# Patient Record
Sex: FEMALE | Race: WHITE | NOT HISPANIC OR LATINO | URBAN - METROPOLITAN AREA
[De-identification: names, ages, dates, MRNs, and addresses within clinical notes are randomized per-mention and may not be internally consistent; named-entity substitution may affect disease eponyms.]

---

## 2023-10-16 ENCOUNTER — INPATIENT (INPATIENT)
Facility: HOSPITAL | Age: 78
LOS: 15 days | Discharge: HOME | DRG: 885 | End: 2023-11-01
Attending: PSYCHIATRY & NEUROLOGY | Admitting: PSYCHIATRY & NEUROLOGY
Payer: MEDICARE

## 2023-10-16 VITALS
RESPIRATION RATE: 18 BRPM | DIASTOLIC BLOOD PRESSURE: 104 MMHG | HEART RATE: 128 BPM | OXYGEN SATURATION: 96 % | SYSTOLIC BLOOD PRESSURE: 179 MMHG | WEIGHT: 100.09 LBS | HEIGHT: 62 IN | TEMPERATURE: 98 F

## 2023-10-16 DIAGNOSIS — F33.2 MAJOR DEPRESSIVE DISORDER, RECURRENT SEVERE WITHOUT PSYCHOTIC FEATURES: ICD-10-CM

## 2023-10-16 LAB
ALBUMIN SERPL ELPH-MCNC: 4.3 G/DL — SIGNIFICANT CHANGE UP (ref 3.3–5)
ALP SERPL-CCNC: 64 U/L — SIGNIFICANT CHANGE UP (ref 40–120)
ALT FLD-CCNC: 10 U/L — SIGNIFICANT CHANGE UP (ref 10–45)
ANION GAP SERPL CALC-SCNC: 13 MMOL/L — SIGNIFICANT CHANGE UP (ref 5–17)
APAP SERPL-MCNC: <5 UG/ML — LOW (ref 10–30)
APTT BLD: 33.8 SEC — SIGNIFICANT CHANGE UP (ref 24.5–35.6)
AST SERPL-CCNC: 20 U/L — SIGNIFICANT CHANGE UP (ref 10–40)
BASE EXCESS BLDV CALC-SCNC: 8.1 MMOL/L — HIGH (ref -2–3)
BASOPHILS # BLD AUTO: 0.06 K/UL — SIGNIFICANT CHANGE UP (ref 0–0.2)
BASOPHILS NFR BLD AUTO: 0.5 % — SIGNIFICANT CHANGE UP (ref 0–2)
BILIRUB SERPL-MCNC: 0.6 MG/DL — SIGNIFICANT CHANGE UP (ref 0.2–1.2)
BUN SERPL-MCNC: 14 MG/DL — SIGNIFICANT CHANGE UP (ref 7–23)
CALCIUM SERPL-MCNC: 9.7 MG/DL — SIGNIFICANT CHANGE UP (ref 8.4–10.5)
CHLORIDE SERPL-SCNC: 100 MMOL/L — SIGNIFICANT CHANGE UP (ref 96–108)
CO2 BLDV-SCNC: 34.9 MMOL/L — HIGH (ref 22–26)
CO2 SERPL-SCNC: 27 MMOL/L — SIGNIFICANT CHANGE UP (ref 22–31)
CREAT SERPL-MCNC: 1.03 MG/DL — SIGNIFICANT CHANGE UP (ref 0.5–1.3)
EGFR: 56 ML/MIN/1.73M2 — LOW
EOSINOPHIL # BLD AUTO: 0.05 K/UL — SIGNIFICANT CHANGE UP (ref 0–0.5)
EOSINOPHIL NFR BLD AUTO: 0.5 % — SIGNIFICANT CHANGE UP (ref 0–6)
ETHANOL SERPL-MCNC: <10 MG/DL — SIGNIFICANT CHANGE UP (ref 0–10)
GLUCOSE SERPL-MCNC: 135 MG/DL — HIGH (ref 70–99)
HCO3 BLDV-SCNC: 33 MMOL/L — HIGH (ref 22–29)
HCT VFR BLD CALC: 38.5 % — SIGNIFICANT CHANGE UP (ref 34.5–45)
HGB BLD-MCNC: 12.7 G/DL — SIGNIFICANT CHANGE UP (ref 11.5–15.5)
IMM GRANULOCYTES NFR BLD AUTO: 0.4 % — SIGNIFICANT CHANGE UP (ref 0–0.9)
INR BLD: 1.05 — SIGNIFICANT CHANGE UP (ref 0.85–1.18)
LYMPHOCYTES # BLD AUTO: 1.21 K/UL — SIGNIFICANT CHANGE UP (ref 1–3.3)
LYMPHOCYTES # BLD AUTO: 11 % — LOW (ref 13–44)
MCHC RBC-ENTMCNC: 27.5 PG — SIGNIFICANT CHANGE UP (ref 27–34)
MCHC RBC-ENTMCNC: 33 GM/DL — SIGNIFICANT CHANGE UP (ref 32–36)
MCV RBC AUTO: 83.5 FL — SIGNIFICANT CHANGE UP (ref 80–100)
MONOCYTES # BLD AUTO: 0.57 K/UL — SIGNIFICANT CHANGE UP (ref 0–0.9)
MONOCYTES NFR BLD AUTO: 5.2 % — SIGNIFICANT CHANGE UP (ref 2–14)
NEUTROPHILS # BLD AUTO: 9.09 K/UL — HIGH (ref 1.8–7.4)
NEUTROPHILS NFR BLD AUTO: 82.4 % — HIGH (ref 43–77)
NRBC # BLD: 0 /100 WBCS — SIGNIFICANT CHANGE UP (ref 0–0)
PCO2 BLDV: 48 MMHG — HIGH (ref 39–42)
PH BLDV: 7.45 — HIGH (ref 7.32–7.43)
PLATELET # BLD AUTO: 221 K/UL — SIGNIFICANT CHANGE UP (ref 150–400)
PO2 BLDV: 55 MMHG — HIGH (ref 25–45)
POTASSIUM SERPL-MCNC: 3.3 MMOL/L — LOW (ref 3.5–5.3)
POTASSIUM SERPL-SCNC: 3.3 MMOL/L — LOW (ref 3.5–5.3)
PROT SERPL-MCNC: 7.4 G/DL — SIGNIFICANT CHANGE UP (ref 6–8.3)
PROTHROM AB SERPL-ACNC: 12 SEC — SIGNIFICANT CHANGE UP (ref 9.5–13)
RBC # BLD: 4.61 M/UL — SIGNIFICANT CHANGE UP (ref 3.8–5.2)
RBC # FLD: 14.5 % — SIGNIFICANT CHANGE UP (ref 10.3–14.5)
SALICYLATES SERPL-MCNC: <0.3 MG/DL — LOW (ref 2.8–20)
SAO2 % BLDV: 89.4 % — HIGH (ref 67–88)
SARS-COV-2 RNA SPEC QL NAA+PROBE: NEGATIVE — SIGNIFICANT CHANGE UP
SARS-COV-2 RNA SPEC QL NAA+PROBE: NEGATIVE — SIGNIFICANT CHANGE UP
SODIUM SERPL-SCNC: 140 MMOL/L — SIGNIFICANT CHANGE UP (ref 135–145)
WBC # BLD: 11.02 K/UL — HIGH (ref 3.8–10.5)
WBC # FLD AUTO: 11.02 K/UL — HIGH (ref 3.8–10.5)

## 2023-10-16 PROCEDURE — 99285 EMERGENCY DEPT VISIT HI MDM: CPT

## 2023-10-16 PROCEDURE — 90792 PSYCH DIAG EVAL W/MED SRVCS: CPT

## 2023-10-16 RX ORDER — SODIUM CHLORIDE 9 MG/ML
1000 INJECTION INTRAMUSCULAR; INTRAVENOUS; SUBCUTANEOUS ONCE
Refills: 0 | Status: COMPLETED | OUTPATIENT
Start: 2023-10-16 | End: 2023-10-16

## 2023-10-16 RX ORDER — CLONAZEPAM 1 MG
0.5 TABLET ORAL ONCE
Refills: 0 | Status: DISCONTINUED | OUTPATIENT
Start: 2023-10-16 | End: 2023-10-16

## 2023-10-16 RX ORDER — AMLODIPINE BESYLATE 2.5 MG/1
5 TABLET ORAL ONCE
Refills: 0 | Status: COMPLETED | OUTPATIENT
Start: 2023-10-16 | End: 2023-10-16

## 2023-10-16 RX ORDER — NICOTINE POLACRILEX 2 MG
2 GUM BUCCAL
Refills: 0 | Status: DISCONTINUED | OUTPATIENT
Start: 2023-10-16 | End: 2023-11-01

## 2023-10-16 RX ORDER — TRAZODONE HCL 50 MG
50 TABLET ORAL AT BEDTIME
Refills: 0 | Status: DISCONTINUED | OUTPATIENT
Start: 2023-10-16 | End: 2023-10-30

## 2023-10-16 RX ORDER — POTASSIUM CHLORIDE 20 MEQ
40 PACKET (EA) ORAL ONCE
Refills: 0 | Status: COMPLETED | OUTPATIENT
Start: 2023-10-16 | End: 2023-10-16

## 2023-10-16 RX ORDER — ALPRAZOLAM 0.25 MG
0.25 TABLET ORAL ONCE
Refills: 0 | Status: DISCONTINUED | OUTPATIENT
Start: 2023-10-16 | End: 2023-10-16

## 2023-10-16 RX ADMIN — Medication 0.5 MILLIGRAM(S): at 17:26

## 2023-10-16 RX ADMIN — Medication 40 MILLIEQUIVALENT(S): at 16:54

## 2023-10-16 RX ADMIN — Medication 0.25 MILLIGRAM(S): at 19:35

## 2023-10-16 RX ADMIN — Medication 50 MILLIGRAM(S): at 23:05

## 2023-10-16 RX ADMIN — AMLODIPINE BESYLATE 5 MILLIGRAM(S): 2.5 TABLET ORAL at 18:12

## 2023-10-16 RX ADMIN — SODIUM CHLORIDE 1000 MILLILITER(S): 9 INJECTION INTRAMUSCULAR; INTRAVENOUS; SUBCUTANEOUS at 14:57

## 2023-10-16 NOTE — ED ADULT NURSE NOTE - NSFALLUNIVINTERV_ED_ALL_ED
Bed/Stretcher in lowest position, wheels locked, appropriate side rails in place/Call bell, personal items and telephone in reach/Instruct patient to call for assistance before getting out of bed/chair/stretcher/Non-slip footwear applied when patient is off stretcher/[place] to call system/Physically safe environment - no spills, clutter or unnecessary equipment/Purposeful proactive rounding/Room/bathroom lighting operational, light cord in reach

## 2023-10-16 NOTE — ED PROVIDER NOTE - EYES, MLM
See patient mychart message and provider response.    Aysha Lock   [facility]  RN     Clear bilaterally, pupils equal, round and reactive to light.

## 2023-10-16 NOTE — ED BEHAVIORAL HEALTH ASSESSMENT NOTE - HPI (INCLUDE ILLNESS QUALITY, SEVERITY, DURATION, TIMING, CONTEXT, MODIFYING FACTORS, ASSOCIATED SIGNS AND SYMPTOMS)
77 yo F, no PMHx, hx depression treated with remeron, trazodone, and klonopin, in treatment with Dr Stevens outpt, no hx SA, arrived with friend s/p suicide attempt last night by overdose.  d/w ER Attg, pt medically cleared, reporting that she wishes she had succeeded last night.  Pt assessed with PA destiney An.  Pt reports being depressed for a long time, lost 20lbs since April, has had financial difficulty and is selling co-op on [address]  Pt with no children or living parents, describes isolation, has been contemplating suicide for months, then finally last night took handfully of klonopin 0.5mg, trazodone 100mg, remeron 15mg, approx #40 tabs in all, with intent to end her life.  Pt told no one of her suicide plans.  Pt started to vomit, then took additional doses of each medication to go to sleep, again with hope of not waking up.  This morning, pt felt shaky, texted psychiatrist and friend Amrita.  Psychiatrist told her to come to the ER, pt did not want to but came with friend.  Pt still wishes she had succeeded, "I've had a good life".  Pt reports being on klonopin 0.5mg PO qHS and trazodone 100mg PO qHS for many years, and over the spring remeron was added daniella to stimulate appetite.  Pt still feels suicidal, feels less likely to reattempt in the hospital but hesitant to answer.    PPHx: no prior SA, admissions, NSSI.  Has been with psychiatrist since .    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  Pt lives alone.  No children.      Substance: pt smokes 1ppd, 1 drink alcohol a few times per year, no other substances.  No hx addiction.    Fhx: mother and father had depression, mother's aunt  by suicide.    Collateral friend Kaylin Fausto [phone], present in ER: has known pt over 40yrs, no hx SA or admission, pt has made offhand comment about suicide that seemed to be a non-literal expression, no serious talk of ending her life.  Pt has been isolative, cancelling on plans, lonely, daniella sad about selling co-op and going through this process alone, financial stressors leading to this, is very depressed about this.    Psychiatrist Dr Stevens [phone]- student left message

## 2023-10-16 NOTE — BH CHART NOTE - NSEVENTNOTEFT_PSY_ALL_CORE
79 yo female with no PMH and PPH of depression treated with remeron, trazodone, and klonopin in outpatient treatment with Dr. Stevens, no prior hx of suicide attempt arrived to the ED BIB friend s/p SA the night prior. This is in the context of losing 20 lbs, financial diffiuclties, selling her co-op on [address]. She has no children or living family and reports feeling isolated with only 3 close friends. She reports months of contemplating suicide and decided to take a handful of klonopin 0.5 mg, trazodone 100mg, and remeron 15 mg approximately 40 tabs. This was in the intent to end her life. On arrival to the unit when asked about continuing SI she reports that "I wish I wasn't here anymore." She follows with Dr. Stevens [phone]. She reports no history of falls and is not attempting to elope from the unit. Notably her BP was elevated in the Ed in the context of no history of HTN, BP on arrival as 156/93. She reports feeling anxious on the unit. The ED team gave the patient amlodipine before admission. She reports smoking 1 ppd cigarettes outside of the hospital with a few drinks a year of alcohol with no other substance use.      in ED     Plan:  legal status 9.13  - 1:1 observation for active suicidal ideation in the context of recent SA.  - ambulate as tolerated.   - ativan 1 mg agitation/benzo withdrawal symptoms  - hold amlodipine 5 mg based on vitals signs in the AM  - nicorette gum 2 mg q2 hours prn   - trazodone 50 mg nightly PO prn for insomnia   - will hold home meds due to SA

## 2023-10-16 NOTE — ED ADULT NURSE NOTE - NS ED BHA MSE SPEECH VOLUME
Returned patient call concerning pre-op workup.  Detailed the reasoning behind the workup.  Patient voiced understanding.    Will proceed.  
Normal

## 2023-10-16 NOTE — ED ADULT NURSE REASSESSMENT NOTE - NS ED NURSE REASSESS COMMENT FT1
PT currently on a 1 to 1, , endorsed by day shift. Pt is alert and oriented x4, VSS, currently awaiting inpatient bed after covid test. Denies chest pain, sob, nausea, vomiting, fever

## 2023-10-16 NOTE — ED BEHAVIORAL HEALTH ASSESSMENT NOTE - ABUSE / TRAUMA HISTORY
Karin Cardoza attended 2 hours of group therapy today.    7/25/2017 2:41 PM Sunita Rincon  
Deferred

## 2023-10-16 NOTE — ED PROVIDER NOTE - OBJECTIVE STATEMENT
77 y/o f with PMH of depression presents to ED with suicidal attempt by overdose.  As per patient she took a mix of remeron, trazadone 100mg and clonazepam 0.5mg for a total of 40 pills at 11pm night prior.  Pt states she immediately proceeded to vomit multiple times and then took her normal dose of medication and went to sleep.  Pt woke at 10am this morning and told her friend what she did and they called psychiatrist who urged pt to present to ED.  Pt has contemplated suicide in the past but has never attempted.  Denies drug use.  + smoking, social alcohol use.  Pt still feels like she wishes she wasn't here.  Friend at bedside as well.

## 2023-10-16 NOTE — ED BEHAVIORAL HEALTH ASSESSMENT NOTE - NS_RISKASSESSMENTINTER_PSY_ALL_CORE
Clean wound with wound cleanser or normal saline  Apply collagen dressing to wound bed  Cut a small piece the size of the wound  Cover with a border gauze  Change daily    Return to clinic in two weeks  Obtain [facility]oform from [facility]    Recognizing and Treating Wound Infection  Wounds can become infected with harmful bacteria. This prevents healing and increases the risk of scars. In some cases, the infection may spread to other parts of the body. And infection with the bacteria that cause tetanus can be fatal. Know what to watch for and get prompt treatment for infection.    Risk Factors  A wound is more likely to become infected if it:  · Results from a puncture, such as from a nail or piece of glass.  · Results from a human or animal bite.  · Isn't cleaned or treated within 8 hours.  · Occurs in your hand, foot, leg, armpit, or groin.  · Contains dirt or saliva.  · Heals very slowly.  · Occurs in a person with diabetes, alcoholism, or a compromised immune system.    Symptoms of Infection  Call your healthcare provider at the first sign of infection, such as:  · Yellow, yellow-green, or foul-smelling drainage from a wound  · Increased pain, swelling, or redness in or near a wound  · A change in the color or size of a wound  · Red streaks in the skin around the wound  · Fever  Preventing Wound Infection   Follow these steps to help keep wounds from developing infection:  · Wash the wound right away with soap and water.  · Apply a small amount of antibiotic ointment. You can buy this at the drugstore without a prescription.  · Cover wounds with a bandage or gauze dressing.  · Keep the wound clean and dry for the first 24 hours.  · Change the dressing daily using sterile gloves.    Treatment  Treatment is likely to depend on the type and extent of your infection. Your healthcare provider may prescribe oral antibiotics to help fight bacteria. He or she may also flush the wound with an antibiotic solution  or apply an antibiotic ointment. Sometimes an abscess (a pocket of pus) may form. In that case, the abscess will be opened and the fluid drained. You may need hospital care if the infection is very severe.  © 0828-7745 Giana Morelos, [address] [place] [address], [place] [address]. All rights reserved. This information is not intended as a substitute for professional medical care. Always follow your healthcare professional's instructions.     High Acute Suicide Risk

## 2023-10-16 NOTE — ED PROVIDER NOTE - CLINICAL SUMMARY MEDICAL DECISION MAKING FREE TEXT BOX
78 F w suicide attempt- incidental HTN noted- no ha/cp/sob- ok to admit to psych- cont norvasc 5 mg 78 F w suicide attempt- incidental HTN noted- no ha/cp/sob- ok to admit to psych- cont norvasc 5 mg    Pt with overdose 15 hours prior to presentation with multiple episodes of vomiting - immediately placed on 1:1, ekg sinus tach otherwise ok, bp noted to be high (benzo withdrawal vs htn) meds given, K replaced, placed on 1:1 and seen by psych who agrees on admission.  Signed out to Dr. Smith pending bp control.

## 2023-10-16 NOTE — BH PATIENT PROFILE - FALL HARM RISK - UNIVERSAL INTERVENTIONS
Bed in lowest position, wheels locked, appropriate side rails in place/Call bell, personal items and telephone in reach/Instruct patient to call for assistance before getting out of bed or chair/Non-slip footwear when patient is out of bed/[place] to call system/Physically safe environment - no spills, clutter or unnecessary equipment/Purposeful Proactive Rounding/Room/bathroom lighting operational, light cord in reach

## 2023-10-16 NOTE — ED ADULT TRIAGE NOTE - CHIEF COMPLAINT QUOTE
pt presents to ER, states she took 30 or 40 pills last night of klonopin, trazadone, and remeron in attempts to kill herself. pt states she vomited shortly after taking pills. pt c/o feeling "foggy" and has some abdominal discomfort. pt placed on 1:1 watch. belongings secured, security called for wanding.

## 2023-10-16 NOTE — ED ADULT NURSE NOTE - OBJECTIVE STATEMENT
Patient to the ED s/p SI attempt last night. Patient reports she has been experiencing financial trouble and attempted to end her life by overdosing on unknown amount of klonopin, trazadone and remeron. C/O fatigue, AAOX4, speaking in complete sentences, NAD.

## 2023-10-16 NOTE — ED ADULT NURSE REASSESSMENT NOTE - NS ED NURSE REASSESS COMMENT FT1
1:1 direct observation initiated upon ED arrival. Patient changed into gown, belongings bagged and wanded by security. Friend at bedside. AAOx4, NAD.

## 2023-10-17 PROCEDURE — 99223 1ST HOSP IP/OBS HIGH 75: CPT

## 2023-10-17 RX ORDER — CLONAZEPAM 1 MG
1 TABLET ORAL AT BEDTIME
Refills: 0 | Status: DISCONTINUED | OUTPATIENT
Start: 2023-10-17 | End: 2023-10-18

## 2023-10-17 RX ORDER — INFLUENZA VIRUS VACCINE 15; 15; 15; 15 UG/.5ML; UG/.5ML; UG/.5ML; UG/.5ML
0.7 SUSPENSION INTRAMUSCULAR ONCE
Refills: 0 | Status: COMPLETED | OUTPATIENT
Start: 2023-10-17 | End: 2023-10-25

## 2023-10-17 RX ORDER — AMLODIPINE BESYLATE 2.5 MG/1
5 TABLET ORAL ONCE
Refills: 0 | Status: COMPLETED | OUTPATIENT
Start: 2023-10-17 | End: 2023-10-17

## 2023-10-17 RX ADMIN — Medication 50 MILLIGRAM(S): at 21:16

## 2023-10-17 RX ADMIN — Medication 1 MILLIGRAM(S): at 21:16

## 2023-10-17 RX ADMIN — AMLODIPINE BESYLATE 5 MILLIGRAM(S): 2.5 TABLET ORAL at 21:16

## 2023-10-17 RX ADMIN — Medication 2 MILLIGRAM(S): at 16:35

## 2023-10-17 RX ADMIN — Medication 1 MILLIGRAM(S): at 02:14

## 2023-10-17 NOTE — BH INPATIENT PSYCHIATRY ASSESSMENT NOTE - NSBHASSESSSUMMFT_PSY_ALL_CORE
This is a 77y/o  female, , retired, domiciled in apartment alone. No pertinent medical hx. Psychiatric hx of reported depression, anxiety, insomnia, currently in treatment with Dr. Stevens (remeron, klonopin, trazodone). No prior psychiatric hospitalizations, no hx of SA or NSSIB. No hx of drug/etoh abuse. Hx of trauma in childhood. No current legal issues. Patient presents to ED at recommendation of her psychiatrist after disclosing to him that she had overdosed on her medications the night before- a combination of remeron, klonopin and trazodone. Utox negative for all substances. Patient admitted to [facility] 2[facility].    -Medicine consult to be placed for recs re elevated bp  -Klonopin 0.5mg qhs  -Trazodone 50mg qhs prn insomnia  -Collateral to be attained from pt's psychiatrist

## 2023-10-17 NOTE — BH INPATIENT PSYCHIATRY ASSESSMENT NOTE - NSBHATTESTAPPBILLTIME_PSY_A_CORE
I attest my time as JOSE is greater than 50% of the total combined time spent on qualifying patient care activities. I have reviewed and verified the documentation.

## 2023-10-17 NOTE — BH INPATIENT PSYCHIATRY ASSESSMENT NOTE - NSBHMETABOLIC_PSY_ALL_CORE_FT
BMI: BMI (kg/m2): 18.3 (10-16-23 @ 14:29)  HbA1c:   Glucose:   BP: 165/92 (10-17-23 @ 09:30) (162/96 - 199/122)  Lipid Panel:  BMI: BMI (kg/m2): 18.3 (10-16-23 @ 14:29)  HbA1c: A1C with Estimated Average Glucose Result: 5.2 % (10-18-23 @ 14:50)    Glucose:   BP: 159/85 (10-19-23 @ 06:12) (146/97 - 199/122)  Lipid Panel: Date/Time: 10-18-23 @ 14:50  Cholesterol, Serum: 249  Direct LDL: --  HDL Cholesterol, Serum: 54  Total Cholesterol/HDL Ration Measurement: --  Triglycerides, Serum: 115   BMI: BMI (kg/m2): 18.3 (10-16-23 @ 14:29)  HbA1c: A1C with Estimated Average Glucose Result: 5.2 % (10-18-23 @ 14:50)    Glucose:   BP: 157/92 (10-20-23 @ 11:50) (136/77 - 176/109)  Lipid Panel: Date/Time: 10-18-23 @ 14:50  Cholesterol, Serum: 249  Direct LDL: --  HDL Cholesterol, Serum: 54  Total Cholesterol/HDL Ration Measurement: --  Triglycerides, Serum: 115

## 2023-10-17 NOTE — BH SOCIAL WORK INITIAL PSYCHOSOCIAL EVALUATION - OTHER PAST PSYCHIATRIC HISTORY (INCLUDE DETAILS REGARDING ONSET, COURSE OF ILLNESS, INPATIENT/OUTPATIENT TREATMENT)
above PPHx Major Depressive Disorder, Recurrent, Moderate  No PMHx  No prior psychiatric hospitalizations  Denies hx NSSIB  Endorses hx SA (x1 that led to current hospitalization, via overdose of prescription psychiatric meds; otherwise no other SA)  Denies current HI, PI, AVH  Endorses current SI (contemplating suicide for the past few months, with plan and intent to overdose on prescription meds, with attempt)    Pt is a 79yo White female,  20 years,  x2, retired business owner, non-caregiver, no children, domiciled in private apartment in [place] by herself. Pt initially presented to [facility] ED, BIB friend Kaylin Lambert ([phone]) a/b psychiatrist Dr. Stevens ([phone]) s/p intentional overdose via prescription psychiatric medications c/o worsening depressive symptoms in the context of psychosocial stressors in continued social isolation. Pt reports having a history of Major Depression "for a long time", most recently with worsening symptoms including significant weight loss (20lbs since April of this year) and worsening mood. On the evening prior to admission, patient attempted suicide by taking a handful of Klonopin, Trazadone, and Remeron (appx. 40 tablets total) with the intent to die without telling anyone. Pt began to vomit after ingesting the tablets, after which she took additional doses of each medication to go to sleep and not wake up. Pt texted psychiatrist and friend who have both recommended pt go to the ED for evaluation. Pt would benefit from continued outpatient medication management, peer support, and individual psychotherapy.

## 2023-10-17 NOTE — BH INPATIENT PSYCHIATRY ASSESSMENT NOTE - NSBHCHARTREVIEWVS_PSY_A_CORE FT
Vital Signs Last 24 Hrs  T(C): 37.2 (10-17-23 @ 09:30), Max: 37.2 (10-17-23 @ 09:30)  T(F): 98.9 (10-17-23 @ 09:30), Max: 98.9 (10-17-23 @ 09:30)  HR: 97 (10-17-23 @ 09:30) (70 - 128)  BP: 165/92 (10-17-23 @ 09:30) (162/96 - 199/122)  BP(mean): --  RR: 18 (10-17-23 @ 09:30) (16 - 18)  SpO2: 97% (10-17-23 @ 09:30) (96% - 99%)     Vital Signs Last 24 Hrs  T(C): 36.4 (10-19-23 @ 06:12), Max: 37.1 (10-18-23 @ 16:34)  T(F): 97.5 (10-19-23 @ 06:12), Max: 98.8 (10-18-23 @ 16:34)  HR: 75 (10-19-23 @ 06:12) (75 - 87)  BP: 159/85 (10-19-23 @ 06:12) (155/92 - 166/119)  BP(mean): --  RR: 18 (10-18-23 @ 16:34) (18 - 18)  SpO2: 97% (10-19-23 @ 06:12) (97% - 97%)     Vital Signs Last 24 Hrs  T(C): 36.8 (10-20-23 @ 09:56), Max: 37.2 (10-19-23 @ 20:22)  T(F): 98.2 (10-20-23 @ 09:56), Max: 98.9 (10-19-23 @ 20:22)  HR: 74 (10-20-23 @ 11:50) (73 - 78)  BP: 157/92 (10-20-23 @ 11:50) (146/81 - 176/109)  BP(mean): --  RR: 18 (10-20-23 @ 06:12) (16 - 18)  SpO2: 97% (10-20-23 @ 09:56) (96% - 97%)

## 2023-10-17 NOTE — CHART NOTE - NSCHARTNOTEFT_GEN_A_CORE
Provider contacted by nursing for patient with HTN. Patient reportedly resting calmly in bed, in no acute distress, no chest pain, no SOB. BP taken 20 minutes ago was 165/121, repeat was 173/111. Provider ordered STAT dose of PO Amlodipine 5 mg with plan to reassess BP in 30 minutes.

## 2023-10-17 NOTE — BH INPATIENT PSYCHIATRY ASSESSMENT NOTE - HPI (INCLUDE ILLNESS QUALITY, SEVERITY, DURATION, TIMING, CONTEXT, MODIFYING FACTORS, ASSOCIATED SIGNS AND SYMPTOMS)
This is a 79y/o  female, , retired, domiciled in apartment alone. No pertinent medical hx. Psychiatric hx of reported depression, anxiety, insomnia, currently in treatment with Dr. Stevens (remeron, klonopin, trazodone). No prior psychiatric hospitalizations, no hx of SA or NSSIB. No hx of drug/etoh abuse. Hx of trauma in childhood. No current legal issues. Patient presents to ED at recommendation of her psychiatrist after disclosing to him that she had overdosed on her medications the night before- a combination of remeron, klonopin and trazodone. Utox negative for all substances. Patient admitted to [address] [place] [address].    Patient states that she has had a good life, is pushing 80 years old and running out of money. She realized that some time ago she was running out of money and would no longer be able to pay for her condo. She decided to sell it. However it required renovations which started in August. She has been feeling overwhelmed with the renovations underway, the various workmen in the space and dust in the home and also having to ask a friend for a $10k loan. Also she had been going through mementos of her life during this time and has been feeling depressed. She had been having thoughts of suicide for the last 2 months and has been 'getting up the courage' to do it. She did not shares this with anyone, and had never attempted suicide before in her life. She told the workmen not to come in for the next 5 days and states 'my intention was to take all these pills, go to sleep and not wake up.' She anticipated that either her super or  would find her. However she was unable to keep the pills down. The next day she woke up and texted her psychiatrist who told her to go to the hospital. She had a friend meet her outside the ED before coming inside. She states that once she realized that she would not be able to keep her standard of living once selling her condo as well as feeling 'very ashamed' about her circumstances in addition to being concerned about appearances she thought more of suicide.    She shares that she has suffered from depression since age 21, and has had therapy on and off since then. She had been treated with trazodone and klonopin for years until April when remeron was added to regimen as she was losing a lot of weight. She states that since deciding to sell her condo her anxiety has increased and subsequently her appetite decreased and in an effort to save money she hasn't been going out to eat much. Tobacco use has also increased from 1/2ppd to 1ppd. Sleep has been much better since addition of remeron. Denies any other drug/etoh use. 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    When asked about feelings regarding recent failed attempt she states 'I feel disappointed.' She also acknowledges that overdose is the ideal attempt for her and other ideas such as jumping out of a building or cutting her wrists would be too dramatic. 'I really wouldn't mind turning out the lights and going to sleep,'     She states that since admission she has spoken to several friends who will help her moving forward with getting a new apartment and financially. She is agreeable to initiation of an antidepressant as she acknowledges feeling depressed. No psychotic sxs or bipolar sxs elicited.       Per ED report:   77 yo F, no PMHx, hx depression treated with remeron, trazodone, and klonopin, in treatment with Dr Stevens outpt, no hx SA, arrived with friend s/p suicide attempt last night by overdose.  d/w ER Attg, pt medically cleared, reporting that she wishes she had succeeded last night.  Pt assessed with PA destiney An.  Pt reports being depressed for a long time, lost 20lbs since April, has had financial difficulty and is selling co-op on [address]  Pt with no children or living parents, describes isolation, has been contemplating suicide for months, then finally last night took handfully of klonopin 0.5mg, trazodone 100mg, remeron 15mg, approx #40 tabs in all, with intent to end her life.  Pt told no one of her suicide plans.  Pt started to vomit, then took additional doses of each medication to go to sleep, again with hope of not waking up.  This morning, pt felt shaky, texted psychiatrist and friend Amrita.  Psychiatrist told her to come to the ER, pt did not want to but came with friend.  Pt still wishes she had succeeded, "I've had a good life".  Pt reports being on klonopin 0.5mg PO qHS and trazodone 100mg PO qHS for many years, and over the spring remeron was added daniella to stimulate appetite.  Pt still feels suicidal, feels less likely to reattempt in the hospital but hesitant to answer.    PPHx: no prior SA, admissions, NSSI.  Has been with psychiatrist since .    Shx:  20yrs,  x2, used to run successful businsses, now retired.  Pt lives alone.  No children.      Substance: pt smokes 1ppd, 1 drink alcohol a few times per year, no other substances.  No hx addiction.    Fhx: mother and father had depression, mother's aunt  by suicide.    Collateral friend Kaylin Lambert [phone], present in ER: has known pt over 40yrs, no hx SA or admission, pt has made offhand comment about suicide that seemed to be a non-literal expression, no serious talk of ending her life.  Pt has been isolative, cancelling on plans, lonely, daniella sad about selling co-op and going through this process alone, financial stressors leading to this, is very depressed about this.    Psychiatrist Dr Stevens [phone]- student left message

## 2023-10-17 NOTE — BH INPATIENT PSYCHIATRY ASSESSMENT NOTE - NSBHCRANIAL_PSY_ALL_CORE
Likely viral  Home and self care measures reviewed    Call if worsening    Pt and parent agreeable to care plan   Recognizes 2 fingers or can read (II)/Smiles, shows teeth, opens mouth, sticks out tongue (V, VII, XI)/Normal speech (IX, X, XII)/Extraocular Eye Movement Intact  (III, IV, VI)/Shoulder shrug (XI)/Hearing intact (VIII)

## 2023-10-17 NOTE — BH INPATIENT PSYCHIATRY ASSESSMENT NOTE - CURRENT MEDICATION
MEDICATIONS  (STANDING):  influenza  Vaccine (HIGH DOSE) 0.7 milliLiter(s) IntraMuscular once    MEDICATIONS  (PRN):  LORazepam     Tablet 1 milliGRAM(s) Oral every 6 hours PRN agitation/benzo withdrawal  nicotine  Polacrilex Gum 2 milliGRAM(s) Oral every 2 hours PRN nicotine dependence without withdrawal  traZODone 50 milliGRAM(s) Oral at bedtime PRN insomnia   MEDICATIONS  (STANDING):  clonazePAM  Tablet 0.5 milliGRAM(s) Oral at bedtime  escitalopram 5 milliGRAM(s) Oral daily  influenza  Vaccine (HIGH DOSE) 0.7 milliLiter(s) IntraMuscular once    MEDICATIONS  (PRN):  LORazepam     Tablet 1 milliGRAM(s) Oral every 6 hours PRN agitation/benzo withdrawal  nicotine  Polacrilex Gum 2 milliGRAM(s) Oral every 2 hours PRN nicotine dependence without withdrawal  traZODone 50 milliGRAM(s) Oral at bedtime PRN insomnia   MEDICATIONS  (STANDING):  clonazePAM  Tablet 0.5 milliGRAM(s) Oral at bedtime  escitalopram 5 milliGRAM(s) Oral daily  influenza  Vaccine (HIGH DOSE) 0.7 milliLiter(s) IntraMuscular once  losartan 25 milliGRAM(s) Oral daily  mirtazapine 7.5 milliGRAM(s) Oral at bedtime  nicotine - 21 mG/24Hr(s) Patch 1 Patch Transdermal daily  polyethylene glycol 3350 17 Gram(s) Oral daily    MEDICATIONS  (PRN):  LORazepam     Tablet 1 milliGRAM(s) Oral every 6 hours PRN agitation/benzo withdrawal  nicotine  Polacrilex Gum 2 milliGRAM(s) Oral every 2 hours PRN nicotine dependence without withdrawal  traZODone 50 milliGRAM(s) Oral at bedtime PRN insomnia

## 2023-10-17 NOTE — BH SOCIAL WORK INITIAL PSYCHOSOCIAL EVALUATION - DETAILS
mother's aunt  by suicide.  Parents had depression Mother's aunt  by suicide. Parents had depression.

## 2023-10-17 NOTE — BH INPATIENT PSYCHIATRY ASSESSMENT NOTE - RISK ASSESSMENT
Static: mental illness, suicide attempt, family hx of mental illness and attempt  Modifiable: current mood episode/ access to medications/treatment  Protective: help seeking, psychiatrist

## 2023-10-17 NOTE — BH INPATIENT PSYCHIATRY ASSESSMENT NOTE - NSBHPHYSICALEXAM_PSY_ALL_CORE
Vital Signs: T: 99F oral, Pulse: 102bpm regular, BP: 154/85 left arm sitting, Respirations: 18 unlabored, SpO2: 95% room air  General Survey: NAD, well-groomed, appears stated age  Skin: No rashes or lesions. Warm, dry, color good  Head: Normocephalic, atraumatic. Scalp without lesions or tenderness  Eyes: PERRLA, extraocular muscles intact  Mouth/throat: Fair dentition  Neck: Full range of motion. No lymphadenopathy  Cardiovascular: Rate and rhythm regular. S1 and S2 clear. No murmur, rub or gallop.  Respiratory: No deformity. No chest wall tenderness. Lungs clear to auscultation.   Abdominal: Non-distended. No scars or lesions. BS normoactive. Soft, nontender   Peripheral Vascular: No edema of feet or ankles. No discoloration or pigment changes. No calf tenderness.   Musculoskeletal: No deformities, wasting or swelling. Full ROM without tenderness. Strength 5/5 bilaterally   Neurological: Alert and oriented x3, cooperative, pleasant. Cranial Nerves II, III, IV, VI, VIII, IX, X, XII intact.

## 2023-10-18 LAB
A1C WITH ESTIMATED AVERAGE GLUCOSE RESULT: 5.2 % — SIGNIFICANT CHANGE UP (ref 4–5.6)
A1C WITH ESTIMATED AVERAGE GLUCOSE RESULT: 5.2 % — SIGNIFICANT CHANGE UP (ref 4–5.6)
CHOLEST SERPL-MCNC: 249 MG/DL — HIGH
CHOLEST SERPL-MCNC: 249 MG/DL — HIGH
ESTIMATED AVERAGE GLUCOSE: 103 MG/DL — SIGNIFICANT CHANGE UP (ref 68–114)
ESTIMATED AVERAGE GLUCOSE: 103 MG/DL — SIGNIFICANT CHANGE UP (ref 68–114)
HCT VFR BLD CALC: 38.2 % — SIGNIFICANT CHANGE UP (ref 34.5–45)
HCT VFR BLD CALC: 38.2 % — SIGNIFICANT CHANGE UP (ref 34.5–45)
HDLC SERPL-MCNC: 54 MG/DL — SIGNIFICANT CHANGE UP
HDLC SERPL-MCNC: 54 MG/DL — SIGNIFICANT CHANGE UP
HGB BLD-MCNC: 12.1 G/DL — SIGNIFICANT CHANGE UP (ref 11.5–15.5)
HGB BLD-MCNC: 12.1 G/DL — SIGNIFICANT CHANGE UP (ref 11.5–15.5)
LIPID PNL WITH DIRECT LDL SERPL: 172 MG/DL — HIGH
LIPID PNL WITH DIRECT LDL SERPL: 172 MG/DL — HIGH
MCHC RBC-ENTMCNC: 26.8 PG — LOW (ref 27–34)
MCHC RBC-ENTMCNC: 26.8 PG — LOW (ref 27–34)
MCHC RBC-ENTMCNC: 31.7 GM/DL — LOW (ref 32–36)
MCHC RBC-ENTMCNC: 31.7 GM/DL — LOW (ref 32–36)
MCV RBC AUTO: 84.5 FL — SIGNIFICANT CHANGE UP (ref 80–100)
MCV RBC AUTO: 84.5 FL — SIGNIFICANT CHANGE UP (ref 80–100)
NON HDL CHOLESTEROL: 195 MG/DL — HIGH
NON HDL CHOLESTEROL: 195 MG/DL — HIGH
NRBC # BLD: 0 /100 WBCS — SIGNIFICANT CHANGE UP (ref 0–0)
NRBC # BLD: 0 /100 WBCS — SIGNIFICANT CHANGE UP (ref 0–0)
PLATELET # BLD AUTO: 215 K/UL — SIGNIFICANT CHANGE UP (ref 150–400)
PLATELET # BLD AUTO: 215 K/UL — SIGNIFICANT CHANGE UP (ref 150–400)
RBC # BLD: 4.52 M/UL — SIGNIFICANT CHANGE UP (ref 3.8–5.2)
RBC # BLD: 4.52 M/UL — SIGNIFICANT CHANGE UP (ref 3.8–5.2)
RBC # FLD: 14.5 % — SIGNIFICANT CHANGE UP (ref 10.3–14.5)
RBC # FLD: 14.5 % — SIGNIFICANT CHANGE UP (ref 10.3–14.5)
TRIGL SERPL-MCNC: 115 MG/DL — SIGNIFICANT CHANGE UP
TRIGL SERPL-MCNC: 115 MG/DL — SIGNIFICANT CHANGE UP
WBC # BLD: 10.03 K/UL — SIGNIFICANT CHANGE UP (ref 3.8–10.5)
WBC # BLD: 10.03 K/UL — SIGNIFICANT CHANGE UP (ref 3.8–10.5)
WBC # FLD AUTO: 10.03 K/UL — SIGNIFICANT CHANGE UP (ref 3.8–10.5)
WBC # FLD AUTO: 10.03 K/UL — SIGNIFICANT CHANGE UP (ref 3.8–10.5)

## 2023-10-18 PROCEDURE — 99232 SBSQ HOSP IP/OBS MODERATE 35: CPT

## 2023-10-18 PROCEDURE — 99222 1ST HOSP IP/OBS MODERATE 55: CPT | Mod: GC

## 2023-10-18 RX ORDER — ESCITALOPRAM OXALATE 10 MG/1
5 TABLET, FILM COATED ORAL DAILY
Refills: 0 | Status: DISCONTINUED | OUTPATIENT
Start: 2023-10-19 | End: 2023-10-20

## 2023-10-18 RX ORDER — CLONAZEPAM 1 MG
0.5 TABLET ORAL AT BEDTIME
Refills: 0 | Status: DISCONTINUED | OUTPATIENT
Start: 2023-10-18 | End: 2023-10-24

## 2023-10-18 RX ADMIN — Medication 2 MILLIGRAM(S): at 16:49

## 2023-10-18 RX ADMIN — Medication 50 MILLIGRAM(S): at 21:08

## 2023-10-18 RX ADMIN — Medication 0.5 MILLIGRAM(S): at 21:08

## 2023-10-18 NOTE — BH INPATIENT PSYCHIATRY PROGRESS NOTE - CURRENT MEDICATION
MEDICATIONS  (STANDING):  clonazePAM  Tablet 0.5 milliGRAM(s) Oral at bedtime  influenza  Vaccine (HIGH DOSE) 0.7 milliLiter(s) IntraMuscular once    MEDICATIONS  (PRN):  LORazepam     Tablet 1 milliGRAM(s) Oral every 6 hours PRN agitation/benzo withdrawal  nicotine  Polacrilex Gum 2 milliGRAM(s) Oral every 2 hours PRN nicotine dependence without withdrawal  traZODone 50 milliGRAM(s) Oral at bedtime PRN insomnia   MEDICATIONS  (STANDING):  clonazePAM  Tablet 0.5 milliGRAM(s) Oral at bedtime  escitalopram 5 milliGRAM(s) Oral daily  influenza  Vaccine (HIGH DOSE) 0.7 milliLiter(s) IntraMuscular once  losartan 25 milliGRAM(s) Oral daily  mirtazapine 7.5 milliGRAM(s) Oral at bedtime  nicotine - 21 mG/24Hr(s) Patch 1 Patch Transdermal daily  polyethylene glycol 3350 17 Gram(s) Oral daily    MEDICATIONS  (PRN):  LORazepam     Tablet 1 milliGRAM(s) Oral every 6 hours PRN agitation/benzo withdrawal  nicotine  Polacrilex Gum 2 milliGRAM(s) Oral every 2 hours PRN nicotine dependence without withdrawal  traZODone 50 milliGRAM(s) Oral at bedtime PRN insomnia

## 2023-10-18 NOTE — CONSULT NOTE ADULT - SUBJECTIVE AND OBJECTIVE BOX
JOVAN MERCED  78y  Female      79 y/o f with PMH of depression presents to ED with suicidal attempt by overdose.  As per patient she took a mix of remeron, trazadone 100mg and clonazepam 0.5mg for a total of 40 pills at 11pm night prior.  Pt states she immediately proceeded to vomit multiple times and then took her normal dose of medication and went to sleep.  Pt woke at 10am this morning and told her friend what she did and they called psychiatrist who urged pt to present to ED.  Pt has contemplated suicide in the past but has never attempted.  Denies drug use.  + smoking, social alcohol use.  Pt still feels like she wishes she wasn't here. Patient being followed by Psychiatry, who consulted medicine given persistent HTN since admission. She has remained asymptomatic, without resolution of her HTN after 2 days of amlodipine 5mg daily.       PAST MEDICAL/SURGICAL HISTORY  PAST MEDICAL & SURGICAL HISTORY:      T(C): 37.2 (10-18-23 @ 07:59), Max: 37.2 (10-18-23 @ 07:59)  HR: 102 (10-18-23 @ 07:59) (54 - 102)  BP: 154/85 (10-18-23 @ 07:59) (146/97 - 173/111)  RR: 18 (10-18-23 @ 07:59) (16 - 18)  SpO2: 95% (10-18-23 @ 07:59) (95% - 98%)  Wt(kg): --Vital Signs Last 24 Hrs  T(C): 37.2 (18 Oct 2023 07:59), Max: 37.2 (18 Oct 2023 07:59)  T(F): 99 (18 Oct 2023 07:59), Max: 99 (18 Oct 2023 07:59)  HR: 102 (18 Oct 2023 07:59) (54 - 102)  BP: 154/85 (18 Oct 2023 07:59) (146/97 - 173/111)  BP(mean): --  RR: 18 (18 Oct 2023 07:59) (16 - 18)  SpO2: 95% (18 Oct 2023 07:59) (95% - 98%)    Parameters below as of 18 Oct 2023 07:59  Patient On (Oxygen Delivery Method): room air        PHYSICAL EXAM:  Patient in a family meeting at the time of our visit, will examine tomorrow.     Consultant(s) Notes Reviewed:  [x ] YES  [ ] NO  Care Discussed with Consultants/Other Providers [ x] YES  [ ] NO    LABS:  CBC   10-18-23 @ 14:50  Hematcorit 38.2  Hemoglobin 12.1  Mean Cell Hemoglobin 26.8  Platelet Count-Automated 215  RBC Count 4.52  Red Cell Distrib Width 14.5  Wbc Count 10.03      BMP  10-16-23 @ 15:01  Anion Gap. Serum 13  Blood Urea Nitrogen,Serm 14  Calcium, Total Serum 9.7  Carbon Dioxide, Serum 27  Chloride, Serum 100  Creatinine, Serum 1.03  eGFR in  --  eGFR in Non Afican American --  Gloucose, serum 135  Potassium, Serum 3.3  Sodium, Serum 140                  CMP  10-16-23 @ 15:01  Dian Aminotransferase(ALT/SGPT)10  Albumin, Serum 4.3  Alkaline Phosphatase, Serum 64  Anion Gap, Serum 13  Aspartate Aminotransferase (AST/SGOT)20  Bilirubin Total, Serum 0.6  Blood Urea Nitrogen, Serum 14  Calcium,Total Serum 9.7  Carbon Dioxide, Serum 27  Chloride, Serum 100  Creatinine, Serum 1.03  eGFR if  --  eGFR if Non African American --  Glucose, Serum 135  Potassium, Serum 3.3  Protein Total, Serum 7.4  Sodium, Serum 140                          PT/INR  PT/INR  10-16-23 @ 15:01  INR 1.05  Prothrombin Time Comment --  Prothrobin Time, [id number]      Amylase/Lipase            RADIOLOGY & ADDITIONAL TESTS:    Imaging Personally Reviewed:  [ ] YES  [ ] NO

## 2023-10-18 NOTE — BH INPATIENT PSYCHIATRY PROGRESS NOTE - NSBHMETABOLIC_PSY_ALL_CORE_FT
BMI: BMI (kg/m2): 18.3 (10-16-23 @ 14:29)  HbA1c: A1C with Estimated Average Glucose Result: 5.2 % (10-18-23 @ 14:50)    Glucose:   BP: 155/92 (10-18-23 @ 16:37) (146/97 - 199/122)  Lipid Panel: Date/Time: 10-18-23 @ 14:50  Cholesterol, Serum: 249  Direct LDL: --  HDL Cholesterol, Serum: 54  Total Cholesterol/HDL Ration Measurement: --  Triglycerides, Serum: 115   BMI: BMI (kg/m2): 18.3 (10-16-23 @ 14:29)  HbA1c: A1C with Estimated Average Glucose Result: 5.2 % (10-18-23 @ 14:50)    Glucose:   BP: 157/92 (10-20-23 @ 11:50) (136/77 - 176/109)  Lipid Panel: Date/Time: 10-18-23 @ 14:50  Cholesterol, Serum: 249  Direct LDL: --  HDL Cholesterol, Serum: 54  Total Cholesterol/HDL Ration Measurement: --  Triglycerides, Serum: 115

## 2023-10-18 NOTE — BH INPATIENT PSYCHIATRY PROGRESS NOTE - NSBHFUPINTERVALHXFT_PSY_A_CORE
Patient seen with Kristel. She states that she had a good visit with two close friends today but felt saddened to hear that her outpatient psychiatrist recommended that she have more frequent psychotherapy sessions with someone else after discharge. Patient acknowledged that she would no longer be able to pay for her sessions with current MD even though she had been getting a discounted rate. Patient is agreeable with initiation of antidepressant to treat sxs and is open to trial of Lexapro at 5mg for two days with plan to titration to 10mg. R/b/a discussed and she verbalized understanding.   No acute medical issues or concerns reported. Sleep was reported to have been improved last night.   Medicine consult placed as pts BP has been elevated since admission. Awaiting recs.

## 2023-10-18 NOTE — CONSULT NOTE ADULT - ASSESSMENT
79 y/o f with PMH of depression presents to ED with suicidal attempt by overdose. Medicine consulted for hypertension.     #HTN  No symptoms suggestive of hypertensive urgency. Has not responded completely to amlodipine 5mg daily. Likely chronic in nature, with contributions from being hospitalized for suicide attempt. End organ function satisfactory at this time, will continue to monitor. No reason to suspect renal artery stenosis or hyperaldosteronism at this time.   - rec continuing amlodipine 5mg daily   - rec starting lisinopril 10mg daily in addition to the amlodipine (patient is >20/10 above BP goal, therefor two drugs are warranted)   - obtain daily BMP's  - place holding parameters on BP medications (hold for SBP less than 100 or DBP less than 60) 77 y/o f with PMH of depression presents to ED with suicidal attempt by overdose. Medicine consulted for hypertension.     #HTN  No symptoms suggestive of hypertensive urgency. Has not responded completely to amlodipine 5mg daily. Likely chronic in nature, with contributions from being hospitalized for suicide attempt. End organ function satisfactory at this time, will continue to monitor. No reason to suspect renal artery stenosis or hyperaldosteronism at this time.   - rec continuing amlodipine 5mg daily   - rec starting lisinopril 10mg daily in addition to the amlodipine (patient is >20/10 above BP goal, therefor two drugs are warranted)   - obtain daily BMP's  - place holding parameters on BP medications (hold for SBP less than 100 or DBP less than 60)    Med consult will continue to follow.  Thank you for this consult. Please page [phone] for any questions.  Recommendations not final until attestation signed by attending.

## 2023-10-18 NOTE — BH INPATIENT PSYCHIATRY PROGRESS NOTE - NSBHASSESSSUMMFT_PSY_ALL_CORE
This is a 79y/o  female, , retired, domiciled in apartment alone. No pertinent medical hx. Psychiatric hx of reported depression, anxiety, insomnia, currently in treatment with Dr. Stevens (remeron, klonopin, trazodone). No prior psychiatric hospitalizations, no hx of SA or NSSIB. No hx of drug/etoh abuse. Hx of trauma in childhood. No current legal issues. Patient presents to ED at recommendation of her psychiatrist after disclosing to him that she had overdosed on her medications the night before- a combination of remeron, klonopin and trazodone. Utox negative for all substances. Patient admitted to [facility] 2pc.    -Medicine consult to be placed for recs re elevated bp  -Klonopin 0.5mg qhs  -Trazodone 50mg qhs prn insomnia  -Collateral to be attained from pt's psychiatrist

## 2023-10-18 NOTE — CONSULT NOTE ADULT - ATTENDING COMMENTS
pt is in family meeting with SW and family member. not able to see.   record reviewed, cr wnl, no ekg in the chart nor in the system.  BP trend and medication reviewed.    - HTN- currently on amlodipine 5 mg - can add lisinopril 10 mg ( low dose ) and would monitor BP trend.     Full consult to follow tomorrow.   waqas Arroyo.

## 2023-10-18 NOTE — BH CHART NOTE - NSEVENTNOTEFT_PSY_ALL_CORE
Physical Exam performed by writer at 11:30am    Vital Signs: T: 99F oral, Pulse: 102bpm regular, BP: 154/85 left arm sitting, Respirations: 18 unlabored, SpO2: 95% room air  General Survey: NAD, well-groomed, appears stated age  Skin: No rashes or lesions. Warm, dry, color good  Head: Normocephalic, atraumatic. Scalp without lesions or tenderness  Eyes: PERRLA, extraocular muscles intact  Mouth/throat: Fair dentition  Neck: Full range of motion. No lymphadenopathy  Cardiovascular: Rate and rhythm regular. S1 and S2 clear. No murmur, rub or gallop.  Respiratory: No deformity. No chest wall tenderness. Lungs clear to auscultation.   Abdominal: Non-distended. No scars or lesions. BS normoactive. Soft, nontender   Peripheral Vascular: No edema of feet or ankles. No discoloration or pigment changes. No calf tenderness.   Musculoskeletal: No deformities, wasting or swelling. Full ROM without tenderness. Strength 5/5 bilaterally   Neurological: Alert and oriented x3, cooperative, pleasant.     TERESITA Ibanez Physical Exam performed by writer at 11:30am    Vital Signs: T: 99F oral, Pulse: 102bpm regular, BP: 154/85 left arm sitting, Respirations: 18 unlabored, SpO2: 95% room air  General Survey: NAD, well-groomed, appears stated age  Skin: No rashes or lesions. Warm, dry, color good  Head: Normocephalic, atraumatic. Scalp without lesions or tenderness  Eyes: PERRLA, extraocular muscles intact  Mouth/throat: Fair dentition  Neck: Full range of motion. No lymphadenopathy  Cardiovascular: Rate and rhythm regular. S1 and S2 clear. No murmur, rub or gallop.  Respiratory: No deformity. No chest wall tenderness. Lungs clear to auscultation.   Abdominal: Non-distended. No scars or lesions. BS normoactive. Soft, nontender   Peripheral Vascular: No edema of feet or ankles. No discoloration or pigment changes. No calf tenderness.   Musculoskeletal: No deformities, wasting or swelling. Full ROM without tenderness. Strength 5/5 bilaterally   Neurological: Alert and oriented x3, cooperative, pleasant. Cranial Nerves II, III, IV, VI, VIII, IX, X, XII intact.     TERESITA Ibanez

## 2023-10-18 NOTE — BH INPATIENT PSYCHIATRY PROGRESS NOTE - NSBHCHARTREVIEWVS_PSY_A_CORE FT
Vital Signs Last 24 Hrs  T(C): 37.1 (10-18-23 @ 16:34), Max: 37.2 (10-18-23 @ 07:59)  T(F): 98.8 (10-18-23 @ 16:34), Max: 99 (10-18-23 @ 07:59)  HR: 87 (10-18-23 @ 16:34) (80 - 102)  BP: 155/92 (10-18-23 @ 16:37) (154/85 - 168/89)  BP(mean): --  RR: 18 (10-18-23 @ 16:34) (18 - 18)  SpO2: 97% (10-18-23 @ 16:34) (95% - 97%)     Vital Signs Last 24 Hrs  T(C): 36.8 (10-20-23 @ 09:56), Max: 37.2 (10-19-23 @ 20:22)  T(F): 98.2 (10-20-23 @ 09:56), Max: 98.9 (10-19-23 @ 20:22)  HR: 74 (10-20-23 @ 11:50) (73 - 78)  BP: 157/92 (10-20-23 @ 11:50) (146/81 - 176/109)  BP(mean): --  RR: 18 (10-20-23 @ 06:12) (16 - 18)  SpO2: 97% (10-20-23 @ 09:56) (96% - 97%)

## 2023-10-19 LAB
ANION GAP SERPL CALC-SCNC: 10 MMOL/L — SIGNIFICANT CHANGE UP (ref 5–17)
ANION GAP SERPL CALC-SCNC: 10 MMOL/L — SIGNIFICANT CHANGE UP (ref 5–17)
BUN SERPL-MCNC: 24 MG/DL — HIGH (ref 7–23)
BUN SERPL-MCNC: 24 MG/DL — HIGH (ref 7–23)
CALCIUM SERPL-MCNC: 9.7 MG/DL — SIGNIFICANT CHANGE UP (ref 8.4–10.5)
CALCIUM SERPL-MCNC: 9.7 MG/DL — SIGNIFICANT CHANGE UP (ref 8.4–10.5)
CHLORIDE SERPL-SCNC: 101 MMOL/L — SIGNIFICANT CHANGE UP (ref 96–108)
CHLORIDE SERPL-SCNC: 101 MMOL/L — SIGNIFICANT CHANGE UP (ref 96–108)
CO2 SERPL-SCNC: 26 MMOL/L — SIGNIFICANT CHANGE UP (ref 22–31)
CO2 SERPL-SCNC: 26 MMOL/L — SIGNIFICANT CHANGE UP (ref 22–31)
CREAT SERPL-MCNC: 1.09 MG/DL — SIGNIFICANT CHANGE UP (ref 0.5–1.3)
CREAT SERPL-MCNC: 1.09 MG/DL — SIGNIFICANT CHANGE UP (ref 0.5–1.3)
EGFR: 52 ML/MIN/1.73M2 — LOW
EGFR: 52 ML/MIN/1.73M2 — LOW
GLUCOSE SERPL-MCNC: 149 MG/DL — HIGH (ref 70–99)
GLUCOSE SERPL-MCNC: 149 MG/DL — HIGH (ref 70–99)
POTASSIUM SERPL-MCNC: 4.4 MMOL/L — SIGNIFICANT CHANGE UP (ref 3.5–5.3)
POTASSIUM SERPL-MCNC: 4.4 MMOL/L — SIGNIFICANT CHANGE UP (ref 3.5–5.3)
POTASSIUM SERPL-SCNC: 4.4 MMOL/L — SIGNIFICANT CHANGE UP (ref 3.5–5.3)
POTASSIUM SERPL-SCNC: 4.4 MMOL/L — SIGNIFICANT CHANGE UP (ref 3.5–5.3)
SODIUM SERPL-SCNC: 137 MMOL/L — SIGNIFICANT CHANGE UP (ref 135–145)
SODIUM SERPL-SCNC: 137 MMOL/L — SIGNIFICANT CHANGE UP (ref 135–145)

## 2023-10-19 PROCEDURE — 99232 SBSQ HOSP IP/OBS MODERATE 35: CPT

## 2023-10-19 RX ORDER — AMLODIPINE BESYLATE 2.5 MG/1
5 TABLET ORAL DAILY
Refills: 0 | Status: DISCONTINUED | OUTPATIENT
Start: 2023-10-19 | End: 2023-10-19

## 2023-10-19 RX ORDER — ONDANSETRON 8 MG/1
4 TABLET, FILM COATED ORAL ONCE
Refills: 0 | Status: COMPLETED | OUTPATIENT
Start: 2023-10-19 | End: 2023-10-19

## 2023-10-19 RX ORDER — LISINOPRIL 2.5 MG/1
10 TABLET ORAL DAILY
Refills: 0 | Status: DISCONTINUED | OUTPATIENT
Start: 2023-10-19 | End: 2023-10-19

## 2023-10-19 RX ADMIN — Medication 50 MILLIGRAM(S): at 21:16

## 2023-10-19 RX ADMIN — ONDANSETRON 4 MILLIGRAM(S): 8 TABLET, FILM COATED ORAL at 13:15

## 2023-10-19 RX ADMIN — Medication 0.5 MILLIGRAM(S): at 21:15

## 2023-10-19 RX ADMIN — ESCITALOPRAM OXALATE 5 MILLIGRAM(S): 10 TABLET, FILM COATED ORAL at 11:34

## 2023-10-19 NOTE — PROGRESS NOTE ADULT - ASSESSMENT
79 y/o f with PMH of depression presents to ED with suicidal attempt by overdose. Medicine consulted for hypertension.     #HTN  No symptoms suggestive of hypertensive urgency. Has not responded completely to amlodipine 5mg daily. Likely chronic in nature, with contributions from being hospitalized for suicide attempt. End organ function satisfactory at this time, will continue to monitor. No reason to suspect renal artery stenosis or hyperaldosteronism at this time. No reported history of elevated BP at home, patient states usually has normal BP.   - Please hold Lisinopril and Amlodipine   - obtain daily BMP's  - Will continue to monitor BP and reassess need for BP meds.     Med consult will continue to follow.  Thank you for this consult. Please page [phone] for any questions.  Recommendations not final until attestation signed by attending. Detail Level: Detailed

## 2023-10-19 NOTE — BH INPATIENT PSYCHIATRY PROGRESS NOTE - CURRENT MEDICATION
MEDICATIONS  (STANDING):  clonazePAM  Tablet 0.5 milliGRAM(s) Oral at bedtime  escitalopram 5 milliGRAM(s) Oral daily  influenza  Vaccine (HIGH DOSE) 0.7 milliLiter(s) IntraMuscular once    MEDICATIONS  (PRN):  LORazepam     Tablet 1 milliGRAM(s) Oral every 6 hours PRN agitation/benzo withdrawal  nicotine  Polacrilex Gum 2 milliGRAM(s) Oral every 2 hours PRN nicotine dependence without withdrawal  traZODone 50 milliGRAM(s) Oral at bedtime PRN insomnia   MEDICATIONS  (STANDING):  clonazePAM  Tablet 0.5 milliGRAM(s) Oral at bedtime  escitalopram 5 milliGRAM(s) Oral daily  influenza  Vaccine (HIGH DOSE) 0.7 milliLiter(s) IntraMuscular once  losartan 25 milliGRAM(s) Oral daily  mirtazapine 7.5 milliGRAM(s) Oral at bedtime  nicotine - 21 mG/24Hr(s) Patch 1 Patch Transdermal daily  polyethylene glycol 3350 17 Gram(s) Oral daily    MEDICATIONS  (PRN):  LORazepam     Tablet 1 milliGRAM(s) Oral every 6 hours PRN agitation/benzo withdrawal  nicotine  Polacrilex Gum 2 milliGRAM(s) Oral every 2 hours PRN nicotine dependence without withdrawal  traZODone 50 milliGRAM(s) Oral at bedtime PRN insomnia

## 2023-10-19 NOTE — BH INPATIENT PSYCHIATRY PROGRESS NOTE - NSBHASSESSSUMMFT_PSY_ALL_CORE
This is a 77y/o  female, , retired, domiciled in apartment alone. No pertinent medical hx. Psychiatric hx of reported depression, anxiety, insomnia, currently in treatment with Dr. Stevens (remeron, klonopin, trazodone). No prior psychiatric hospitalizations, no hx of SA or NSSIB. No hx of drug/etoh abuse. Hx of trauma in childhood. No current legal issues. Patient presents to ED at recommendation of her psychiatrist after disclosing to him that she had overdosed on her medications the night before- a combination of remeron, klonopin and trazodone. Utox negative for all substances. Patient admitted to [address] [place] [address]. This is a 79y/o  female, , retired, domiciled in apartment alone. No pertinent medical hx. Psychiatric hx of reported depression, anxiety, insomnia, currently in treatment with Dr. Stevens (remeron, klonopin, trazodone). No prior psychiatric hospitalizations, no hx of SA or NSSIB. No hx of drug/etoh abuse. Hx of trauma in childhood. No current legal issues. Patient presents to ED at recommendation of her psychiatrist after disclosing to him that she had overdosed on her medications the night before- a combination of remeron, klonopin and trazodone. Utox negative for all substances. Patient admitted to [facility] 2pc.    -Medicine consult to be placed for recs re elevated bp-awaiting recs  -Lexapro 5mg qd will continue to titrate as appropriate  -Klonopin 0.5mg qhs  -Trazodone 50mg qhs prn insomnia  -Collateral to be attained from pt's psychiatrist

## 2023-10-19 NOTE — BH INPATIENT PSYCHIATRY PROGRESS NOTE - NSBHMETABOLIC_PSY_ALL_CORE_FT
BMI: BMI (kg/m2): 18.3 (10-16-23 @ 14:29)  HbA1c: A1C with Estimated Average Glucose Result: 5.2 % (10-18-23 @ 14:50)    Glucose:   BP: 136/77 (10-19-23 @ 09:00) (136/77 - 199/122)  Lipid Panel: Date/Time: 10-18-23 @ 14:50  Cholesterol, Serum: 249  Direct LDL: --  HDL Cholesterol, Serum: 54  Total Cholesterol/HDL Ration Measurement: --  Triglycerides, Serum: 115   BMI: BMI (kg/m2): 18.3 (10-16-23 @ 14:29)  HbA1c: A1C with Estimated Average Glucose Result: 5.2 % (10-18-23 @ 14:50)    Glucose:   BP: 157/92 (10-20-23 @ 11:50) (136/77 - 176/109)  Lipid Panel: Date/Time: 10-18-23 @ 14:50  Cholesterol, Serum: 249  Direct LDL: --  HDL Cholesterol, Serum: 54  Total Cholesterol/HDL Ration Measurement: --  Triglycerides, Serum: 115

## 2023-10-19 NOTE — BH INPATIENT PSYCHIATRY PROGRESS NOTE - NSBHCHARTREVIEWVS_PSY_A_CORE FT
Vital Signs Last 24 Hrs  T(C): 36.6 (10-19-23 @ 09:00), Max: 37.1 (10-18-23 @ 16:34)  T(F): 97.8 (10-19-23 @ 09:00), Max: 98.8 (10-18-23 @ 16:34)  HR: 77 (10-19-23 @ 09:00) (75 - 87)  BP: 136/77 (10-19-23 @ 09:00) (136/77 - 166/119)  BP(mean): --  RR: 18 (10-19-23 @ 09:00) (18 - 18)  SpO2: 95% (10-19-23 @ 09:00) (95% - 97%)     Vital Signs Last 24 Hrs  T(C): 36.8 (10-20-23 @ 09:56), Max: 37.2 (10-19-23 @ 20:22)  T(F): 98.2 (10-20-23 @ 09:56), Max: 98.9 (10-19-23 @ 20:22)  HR: 74 (10-20-23 @ 11:50) (73 - 78)  BP: 157/92 (10-20-23 @ 11:50) (146/81 - 176/109)  BP(mean): --  RR: 18 (10-20-23 @ 06:12) (16 - 18)  SpO2: 97% (10-20-23 @ 09:56) (96% - 97%)

## 2023-10-19 NOTE — BH INPATIENT PSYCHIATRY PROGRESS NOTE - NSBHFUPINTERVALHXFT_PSY_A_CORE
Patient seen by writer and NP Lorin Mckay. Patient states that she is feeling anxious about the fact that her life after hospitalization is going to look different due to her finances. Patient states that she would be okay with falling asleep and never waking up again, stating "I am not brave enough to jump off a bridge or slit my wrists". She states that at this time she does not have a plan to end her life and expressed disappointment that her previous suicide attempt was unsuccessful. Patient emphasized that she never planned on living this long and never envisioned what her life would look like at this age because her parents had both  at younger ages. Patient discussed how she had no family support system but that her friends were like family members to her and would be her support system. Patient states her sleep was not as good as the night before. Patient states her appetite has been poor. Patient started on 5mg of Lexapro today, she states that she is feeling nauseous (Zofran was given with some relief) but she also endorses feeling nauseous when she is anxious. Denies HI, auditory/visual hallucinations. Per staff report, the patient had visitors yesterday, participated in group, but was anxious throughout the afternoon and night.   Medicine consult was talked to by writer who said they did not want to start any antihypertension medications at this time and wanted to wait a few days to see if her blood pressures trended down after settling into the unit.

## 2023-10-19 NOTE — PROGRESS NOTE ADULT - SUBJECTIVE AND OBJECTIVE BOX
OVERNIGHT EVENTS: NAEO    SUBJECTIVE / INTERVAL HPI: Patient seen and examined at bedside. Patient denying chest pain, SOB, palpitations, cough. Patient denies fever, chills, HA, Dizziness, N/V, abdominal pain, diarrhea, constipation, hematochezia/melena, dysuria, hematuria, new onset weakness/numbness, LE pain and/or swelling. No reported hx of elevated BP.     Remaining ROS negative       PHYSICAL EXAM:    General:NAD.   HEENT: NC/AT; PERRL, anicteric sclera; MMM  Neck: supple  Cardiovascular: +S1/S2, RRR  Respiratory: CTA B/L; no W/R/R  Gastrointestinal: soft, NT/ND; +BSx4  Extremities: WWP; no edema, clubbing or cyanosis  Vascular: 2+ radial, DP/PT pulses B/L  Neurological: AAOx3; no focal deficits  Psychiatric: pleasant mood and affect  Dermatologic: no appreciable wounds or damage to the skin    VITAL SIGNS:  Vital Signs Last 24 Hrs  T(C): 36.6 (19 Oct 2023 09:00), Max: 37.1 (18 Oct 2023 16:34)  T(F): 97.8 (19 Oct 2023 09:00), Max: 98.8 (18 Oct 2023 16:34)  HR: 77 (19 Oct 2023 09:00) (75 - 87)  BP: 136/77 (19 Oct 2023 09:00) (136/77 - 166/119)  BP(mean): --  RR: 18 (19 Oct 2023 09:00) (18 - 18)  SpO2: 95% (19 Oct 2023 09:00) (95% - 97%)    Parameters below as of 19 Oct 2023 09:00  Patient On (Oxygen Delivery Method): room air          MEDICATIONS:  MEDICATIONS  (STANDING):  amLODIPine   Tablet 5 milliGRAM(s) Oral daily  clonazePAM  Tablet 0.5 milliGRAM(s) Oral at bedtime  escitalopram 5 milliGRAM(s) Oral daily  influenza  Vaccine (HIGH DOSE) 0.7 milliLiter(s) IntraMuscular once  lisinopril 10 milliGRAM(s) Oral daily  ondansetron    Tablet 4 milliGRAM(s) Oral once    MEDICATIONS  (PRN):  LORazepam     Tablet 1 milliGRAM(s) Oral every 6 hours PRN agitation/benzo withdrawal  nicotine  Polacrilex Gum 2 milliGRAM(s) Oral every 2 hours PRN nicotine dependence without withdrawal  traZODone 50 milliGRAM(s) Oral at bedtime PRN insomnia      ALLERGIES:  Allergies    penicillin (Hives)  tetracycline (Hives)    Intolerances        LABS:                        12.1   10.03 )-----------( 215      ( 18 Oct 2023 14:50 )             38.2               CAPILLARY BLOOD GLUCOSE          RADIOLOGY & ADDITIONAL TESTS: Reviewed.

## 2023-10-20 LAB
ANION GAP SERPL CALC-SCNC: 8 MMOL/L — SIGNIFICANT CHANGE UP (ref 5–17)
ANION GAP SERPL CALC-SCNC: 8 MMOL/L — SIGNIFICANT CHANGE UP (ref 5–17)
BUN SERPL-MCNC: 22 MG/DL — SIGNIFICANT CHANGE UP (ref 7–23)
BUN SERPL-MCNC: 22 MG/DL — SIGNIFICANT CHANGE UP (ref 7–23)
CALCIUM SERPL-MCNC: 9.3 MG/DL — SIGNIFICANT CHANGE UP (ref 8.4–10.5)
CALCIUM SERPL-MCNC: 9.3 MG/DL — SIGNIFICANT CHANGE UP (ref 8.4–10.5)
CHLORIDE SERPL-SCNC: 99 MMOL/L — SIGNIFICANT CHANGE UP (ref 96–108)
CHLORIDE SERPL-SCNC: 99 MMOL/L — SIGNIFICANT CHANGE UP (ref 96–108)
CO2 SERPL-SCNC: 28 MMOL/L — SIGNIFICANT CHANGE UP (ref 22–31)
CO2 SERPL-SCNC: 28 MMOL/L — SIGNIFICANT CHANGE UP (ref 22–31)
CREAT SERPL-MCNC: 1.15 MG/DL — SIGNIFICANT CHANGE UP (ref 0.5–1.3)
CREAT SERPL-MCNC: 1.15 MG/DL — SIGNIFICANT CHANGE UP (ref 0.5–1.3)
EGFR: 49 ML/MIN/1.73M2 — LOW
EGFR: 49 ML/MIN/1.73M2 — LOW
GLUCOSE SERPL-MCNC: 102 MG/DL — HIGH (ref 70–99)
GLUCOSE SERPL-MCNC: 102 MG/DL — HIGH (ref 70–99)
POTASSIUM SERPL-MCNC: 4.1 MMOL/L — SIGNIFICANT CHANGE UP (ref 3.5–5.3)
POTASSIUM SERPL-MCNC: 4.1 MMOL/L — SIGNIFICANT CHANGE UP (ref 3.5–5.3)
POTASSIUM SERPL-SCNC: 4.1 MMOL/L — SIGNIFICANT CHANGE UP (ref 3.5–5.3)
POTASSIUM SERPL-SCNC: 4.1 MMOL/L — SIGNIFICANT CHANGE UP (ref 3.5–5.3)
SODIUM SERPL-SCNC: 135 MMOL/L — SIGNIFICANT CHANGE UP (ref 135–145)
SODIUM SERPL-SCNC: 135 MMOL/L — SIGNIFICANT CHANGE UP (ref 135–145)

## 2023-10-20 PROCEDURE — 99232 SBSQ HOSP IP/OBS MODERATE 35: CPT

## 2023-10-20 PROCEDURE — 99232 SBSQ HOSP IP/OBS MODERATE 35: CPT | Mod: GC

## 2023-10-20 RX ORDER — POLYETHYLENE GLYCOL 3350 17 G/17G
17 POWDER, FOR SOLUTION ORAL DAILY
Refills: 0 | Status: DISCONTINUED | OUTPATIENT
Start: 2023-10-20 | End: 2023-10-28

## 2023-10-20 RX ORDER — NICOTINE POLACRILEX 2 MG
1 GUM BUCCAL DAILY
Refills: 0 | Status: DISCONTINUED | OUTPATIENT
Start: 2023-10-20 | End: 2023-11-01

## 2023-10-20 RX ORDER — LOSARTAN POTASSIUM 100 MG/1
25 TABLET, FILM COATED ORAL DAILY
Refills: 0 | Status: DISCONTINUED | OUTPATIENT
Start: 2023-10-20 | End: 2023-10-21

## 2023-10-20 RX ORDER — ESCITALOPRAM OXALATE 10 MG/1
10 TABLET, FILM COATED ORAL DAILY
Refills: 0 | Status: DISCONTINUED | OUTPATIENT
Start: 2023-10-20 | End: 2023-11-01

## 2023-10-20 RX ORDER — MIRTAZAPINE 45 MG/1
7.5 TABLET, ORALLY DISINTEGRATING ORAL AT BEDTIME
Refills: 0 | Status: DISCONTINUED | OUTPATIENT
Start: 2023-10-20 | End: 2023-10-23

## 2023-10-20 RX ADMIN — Medication 1 PATCH: at 16:12

## 2023-10-20 RX ADMIN — Medication 1 MILLIGRAM(S): at 10:15

## 2023-10-20 RX ADMIN — MIRTAZAPINE 7.5 MILLIGRAM(S): 45 TABLET, ORALLY DISINTEGRATING ORAL at 21:20

## 2023-10-20 RX ADMIN — POLYETHYLENE GLYCOL 3350 17 GRAM(S): 17 POWDER, FOR SOLUTION ORAL at 16:13

## 2023-10-20 RX ADMIN — Medication 0.5 MILLIGRAM(S): at 21:20

## 2023-10-20 RX ADMIN — Medication 50 MILLIGRAM(S): at 21:20

## 2023-10-20 RX ADMIN — ESCITALOPRAM OXALATE 5 MILLIGRAM(S): 10 TABLET, FILM COATED ORAL at 10:16

## 2023-10-20 RX ADMIN — LOSARTAN POTASSIUM 25 MILLIGRAM(S): 100 TABLET, FILM COATED ORAL at 16:12

## 2023-10-20 NOTE — BH INPATIENT PSYCHIATRY PROGRESS NOTE - NSBHMETABOLIC_PSY_ALL_CORE_FT
BMI: BMI (kg/m2): 18.3 (10-16-23 @ 14:29)  HbA1c: A1C with Estimated Average Glucose Result: 5.2 % (10-18-23 @ 14:50)    Glucose:   BP: 157/92 (10-20-23 @ 11:50) (136/77 - 176/109)  Lipid Panel: Date/Time: 10-18-23 @ 14:50  Cholesterol, Serum: 249  Direct LDL: --  HDL Cholesterol, Serum: 54  Total Cholesterol/HDL Ration Measurement: --  Triglycerides, Serum: 115

## 2023-10-20 NOTE — PROGRESS NOTE ADULT - SUBJECTIVE AND OBJECTIVE BOX
O/N Events: naeo    Subjective/ROS: Patient seen and examined at bedside.     Denies Fever/Chills, HA, CP, SOB, n/v, changes in bowel/urinary habits.  12pt ROS otherwise negative.    VITALS  Vital Signs Last 24 Hrs  T(C): 36.8 (20 Oct 2023 09:56), Max: 37.2 (19 Oct 2023 20:22)  T(F): 98.2 (20 Oct 2023 09:56), Max: 98.9 (19 Oct 2023 20:22)  HR: 74 (20 Oct 2023 11:50) (73 - 78)  BP: 157/92 (20 Oct 2023 11:50) (146/81 - 176/109)  BP(mean): --  RR: 18 (20 Oct 2023 06:12) (16 - 18)  SpO2: 97% (20 Oct 2023 09:56) (96% - 97%)    Parameters below as of 20 Oct 2023 09:56  Patient On (Oxygen Delivery Method): room air        CAPILLARY BLOOD GLUCOSE          PHYSICAL EXAM  General: NAD  Extremities: WWP; no edema/cyanosis  Neurological: A&Ox3    MEDICATIONS  (STANDING):  clonazePAM  Tablet 0.5 milliGRAM(s) Oral at bedtime  escitalopram 5 milliGRAM(s) Oral daily  influenza  Vaccine (HIGH DOSE) 0.7 milliLiter(s) IntraMuscular once  losartan 25 milliGRAM(s) Oral daily  mirtazapine 7.5 milliGRAM(s) Oral at bedtime  nicotine - 21 mG/24Hr(s) Patch 1 Patch Transdermal daily  polyethylene glycol 3350 17 Gram(s) Oral daily    MEDICATIONS  (PRN):  LORazepam     Tablet 1 milliGRAM(s) Oral every 6 hours PRN agitation/benzo withdrawal  nicotine  Polacrilex Gum 2 milliGRAM(s) Oral every 2 hours PRN nicotine dependence without withdrawal  traZODone 50 milliGRAM(s) Oral at bedtime PRN insomnia      penicillin (Hives)  tetracycline (Hives)      LABS    10-19    137  |  101  |  24<H>  ----------------------------<  149<H>  4.4   |  26  |  1.09    Ca    9.7      19 Oct 2023 14:00        Urinalysis Basic - ( 19 Oct 2023 14:00 )    Color: x / Appearance: x / SG: x / pH: x  Gluc: 149 mg/dL / Ketone: x  / Bili: x / Urobili: x   Blood: x / Protein: x / Nitrite: x   Leuk Esterase: x / RBC: x / WBC x   Sq Epi: x / Non Sq Epi: x / Bacteria: x              IMAGING/EKG/ETC

## 2023-10-20 NOTE — BH INPATIENT PSYCHIATRY PROGRESS NOTE - NSBHCHARTREVIEWVS_PSY_A_CORE FT
Vital Signs Last 24 Hrs  T(C): 36.8 (10-20-23 @ 09:56), Max: 37.2 (10-19-23 @ 20:22)  T(F): 98.2 (10-20-23 @ 09:56), Max: 98.9 (10-19-23 @ 20:22)  HR: 74 (10-20-23 @ 11:50) (73 - 78)  BP: 157/92 (10-20-23 @ 11:50) (146/81 - 176/109)  BP(mean): --  RR: 18 (10-20-23 @ 06:12) (16 - 18)  SpO2: 97% (10-20-23 @ 09:56) (96% - 97%)

## 2023-10-20 NOTE — PROGRESS NOTE ADULT - ASSESSMENT
77 y/o f with PMH of depression presents to ED with suicidal attempt by overdose. Medicine consulted for hypertension.     #HTN  No symptoms suggestive of hypertensive urgency. Has not responded completely to amlodipine 5mg daily. Likely chronic in nature, with contributions from being hospitalized for suicide attempt. End organ function satisfactory at this time, will continue to monitor. No reason to suspect renal artery stenosis or hyperaldosteronism at this time. No reported history of elevated BP at home, patient states usually has normal BP.   - start Losartan 25mg  - start nicotine patch 21mg for one week  - obtain daily BMP's  - Will continue to monitor BP and reassess need for BP meds.     #Weight Loss  patient states has had decreased appetite 2/2 stress. has lost weight over the past few months  - please start Ensure chocolate flavoring. 2 a day    Med consult will continue to follow.  Thank you for this consult. Please page [phone] for any questions.  Recommendations not final until attestation signed by attending.

## 2023-10-20 NOTE — BH INPATIENT PSYCHIATRY PROGRESS NOTE - NSBHFUPINTERVALHXFT_PSY_A_CORE
Patient visible on the unit throughout the day, observed going to several groups, interacting with peers and staff as well as her visitors during visiting hours. Noted to be pleasant and cooperative on approach. She endorses feeling 'very depressed' stating that it would be fine if she were to go to sleep forever. She reports feeling that she has had a great life, done it all and is 'over it.' Denies active si/hi/avh or PI. She has been more forthcoming with her friends who she states have also told her that they feel similar in terms of their depression and being 'over it all.' She is hopeful that combination of lexapro and psychotherapy will be helpful and clear up her depression. She has been in contact with her outpatient MD and shares that she had a good conversation with him, is happy that he will still be a part of her care and thinks of him as a 'good son.'  No acute medical concerns, is aware that antihypertensive will be started per medicine recs and is agreeable to it. She also requests that Ensure and miralax be added to regimen for appetite and constipation. Sleep last night was fair and she is aware that remeron is to be readded
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
## 2023-10-21 PROCEDURE — 99232 SBSQ HOSP IP/OBS MODERATE 35: CPT

## 2023-10-21 PROCEDURE — 99232 SBSQ HOSP IP/OBS MODERATE 35: CPT | Mod: GC

## 2023-10-21 RX ORDER — LOSARTAN POTASSIUM 100 MG/1
50 TABLET, FILM COATED ORAL DAILY
Refills: 0 | Status: DISCONTINUED | OUTPATIENT
Start: 2023-10-21 | End: 2023-11-01

## 2023-10-21 RX ADMIN — Medication 0.5 MILLIGRAM(S): at 22:02

## 2023-10-21 RX ADMIN — LOSARTAN POTASSIUM 50 MILLIGRAM(S): 100 TABLET, FILM COATED ORAL at 12:56

## 2023-10-21 RX ADMIN — ESCITALOPRAM OXALATE 10 MILLIGRAM(S): 10 TABLET, FILM COATED ORAL at 10:34

## 2023-10-21 RX ADMIN — POLYETHYLENE GLYCOL 3350 17 GRAM(S): 17 POWDER, FOR SOLUTION ORAL at 10:34

## 2023-10-21 RX ADMIN — MIRTAZAPINE 7.5 MILLIGRAM(S): 45 TABLET, ORALLY DISINTEGRATING ORAL at 22:02

## 2023-10-21 RX ADMIN — Medication 1 PATCH: at 10:34

## 2023-10-21 RX ADMIN — Medication 50 MILLIGRAM(S): at 22:02

## 2023-10-21 RX ADMIN — LOSARTAN POTASSIUM 25 MILLIGRAM(S): 100 TABLET, FILM COATED ORAL at 10:34

## 2023-10-21 NOTE — BH INPATIENT PSYCHIATRY PROGRESS NOTE - CURRENT MEDICATION
MEDICATIONS  (STANDING):  clonazePAM  Tablet 0.5 milliGRAM(s) Oral at bedtime  escitalopram 10 milliGRAM(s) Oral daily  influenza  Vaccine (HIGH DOSE) 0.7 milliLiter(s) IntraMuscular once  losartan 50 milliGRAM(s) Oral daily  mirtazapine 7.5 milliGRAM(s) Oral at bedtime  nicotine - 21 mG/24Hr(s) Patch 1 Patch Transdermal daily  polyethylene glycol 3350 17 Gram(s) Oral daily    MEDICATIONS  (PRN):  LORazepam     Tablet 1 milliGRAM(s) Oral every 6 hours PRN agitation/benzo withdrawal  nicotine  Polacrilex Gum 2 milliGRAM(s) Oral every 2 hours PRN nicotine dependence without withdrawal  traZODone 50 milliGRAM(s) Oral at bedtime PRN insomnia

## 2023-10-21 NOTE — BH INPATIENT PSYCHIATRY PROGRESS NOTE - NSBHMETABOLIC_PSY_ALL_CORE_FT
BMI: BMI (kg/m2): 18.3 (10-16-23 @ 14:29)  HbA1c: A1C with Estimated Average Glucose Result: 5.2 % (10-18-23 @ 14:50)    Glucose:   BP: 158/78 (10-21-23 @ 08:42) (136/77 - 176/109)  Lipid Panel: Date/Time: 10-18-23 @ 14:50  Cholesterol, Serum: 249  Direct LDL: --  HDL Cholesterol, Serum: 54  Total Cholesterol/HDL Ration Measurement: --  Triglycerides, Serum: 115

## 2023-10-21 NOTE — BH INPATIENT PSYCHIATRY PROGRESS NOTE - NSBHFUPINTERVALHXFT_PSY_A_CORE
Patient reports feeling predominantly sad, but much less anxious. She also reports that she slept well last night. She states that she felt a lot of shame regarding her financial situation as she has a lot of debt, and did not feel that she had anything to live for anymore. She has some hope regarding her situation as she has a friend who went over to her house to help her sort out of her affairs and access money that could resolve the debt. She is thinking about selling her apartment and downsizing. 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 He passed away 20 years ago, and she met someone else, a psychiatrist at [facility] who was closing his practice, who she did not expect to love. After several years with him, however, he was diagnosed with pancreatic cancer. She feels that she has processed her grief, but is no longer interested in meeting anyone. She has been in care of a psychiatrist who also does therapy with her for a long time; her long-time psychiatrist passed away from [facility] and transferred her to her current psychiatrist, who has helped her a lot. He had encouraged her to give back to charitable organizations with her skills, and she very much enjoyed working at a [employer]. However, she had to stop due to the pandemic, and she does not feel that she can do that work again as her skills are less relevant and the job was physically demanding. She recalls last traveling with friends in 2018, and although she has friends in [place] and [place] who she can visit, the idea of going through the airport is too daunting. She states that going to the [employer] and [employer] was her julian de vivre, but she can no longer afford it. She reflects on previously having memberships to the [employer], [employer], and [employer]. Writer encouraged her to obtain a library card, which she agreed with as she loves reading, and [employer] to access museums.

## 2023-10-21 NOTE — PROGRESS NOTE ADULT - ASSESSMENT
77 y/o f with PMH of depression presents to ED with suicidal attempt by overdose. Medicine consulted for hypertension.     #HTN  No symptoms suggestive of hypertensive urgency. Has not responded completely to amlodipine 5mg daily. Likely chronic in nature, with contributions from being hospitalized for suicide attempt. End organ function satisfactory at this time, will continue to monitor. No reason to suspect renal artery stenosis or hyperaldosteronism at this time. No reported history of elevated BP at home, patient states usually has normal BP.   - Increase Losartan to 50mg Qd, will continue to monitor BP and reassess need for BP meds.  - Obtain daily BMP's  - C/w nicotine patch 21mg for one week     #Weight Loss  patient states has had decreased appetite 2/2 stress. has lost weight over the past few months  - please c/w Ensure chocolate flavoring 1 can BID.    Med consult will continue to follow.  Thank you for this consult. Please page [phone] for any questions.  Recommendations not final until attestation signed by attending.

## 2023-10-21 NOTE — BH INPATIENT PSYCHIATRY PROGRESS NOTE - NSBHCHARTREVIEWVS_PSY_A_CORE FT
Vital Signs Last 24 Hrs  T(C): 36.9 (10-21-23 @ 08:42), Max: 37.1 (10-20-23 @ 17:51)  T(F): 98.4 (10-21-23 @ 08:42), Max: 98.8 (10-20-23 @ 17:51)  HR: 85 (10-21-23 @ 08:42) (70 - 85)  BP: 158/78 (10-21-23 @ 08:42) (157/88 - 174/95)  BP(mean): --  RR: 18 (10-21-23 @ 08:42) (18 - 18)  SpO2: 95% (10-21-23 @ 08:42) (95% - 98%)

## 2023-10-21 NOTE — PROGRESS NOTE ADULT - SUBJECTIVE AND OBJECTIVE BOX
OVERNIGHT EVENTS: JESSICA    SUBJECTIVE:  Patient seen and examined at bedside.  Concerned about not being able to get cholate ensures that she can tolerate. Otherwise, no acute complaints.     Vital Signs Last 12 Hrs  T(F): 98.4 (10-21-23 @ 08:42), Max: 98.4 (10-21-23 @ 08:42)  HR: 85 (10-21-23 @ 08:42) (70 - 85)  BP: 158/78 (10-21-23 @ 08:42) (157/88 - 158/78)  BP(mean): --  RR: 18 (10-21-23 @ 08:42) (18 - 18)  SpO2: 95% (10-21-23 @ 08:42) (95% - 96%)  I&O's Summary    PHYSICAL EXAM:  General: NAD  Extremities: WWP; no edema/cyanosis  Neurological: A&Ox3    LABS:    10-20    135  |  99  |  22  ----------------------------<  102<H>  4.1   |  28  |  1.15    Ca    9.3      20 Oct 2023 16:35    Urinalysis Basic - ( 20 Oct 2023 16:35 )    Color: x / Appearance: x / SG: x / pH: x  Gluc: 102 mg/dL / Ketone: x  / Bili: x / Urobili: x   Blood: x / Protein: x / Nitrite: x   Leuk Esterase: x / RBC: x / WBC x   Sq Epi: x / Non Sq Epi: x / Bacteria: x    RADIOLOGY & ADDITIONAL TESTS:    MEDICATIONS  (STANDING):  clonazePAM  Tablet 0.5 milliGRAM(s) Oral at bedtime  escitalopram 10 milliGRAM(s) Oral daily  influenza  Vaccine (HIGH DOSE) 0.7 milliLiter(s) IntraMuscular once  losartan 25 milliGRAM(s) Oral daily  mirtazapine 7.5 milliGRAM(s) Oral at bedtime  nicotine - 21 mG/24Hr(s) Patch 1 Patch Transdermal daily  polyethylene glycol 3350 17 Gram(s) Oral daily    MEDICATIONS  (PRN):  LORazepam     Tablet 1 milliGRAM(s) Oral every 6 hours PRN agitation/benzo withdrawal  nicotine  Polacrilex Gum 2 milliGRAM(s) Oral every 2 hours PRN nicotine dependence without withdrawal  traZODone 50 milliGRAM(s) Oral at bedtime PRN insomnia

## 2023-10-21 NOTE — BH INPATIENT PSYCHIATRY PROGRESS NOTE - NSBHASSESSSUMMFT_PSY_ALL_CORE
Patient is a 78 year old female with PPH of depression who remains with passive SI secondary to situational stressors and hopelessness. She reports less anxiety and improved sleep today. She was receptive to brief interpersonal therapy and was able to identify several supports and things to look forward to.

## 2023-10-22 PROCEDURE — 99232 SBSQ HOSP IP/OBS MODERATE 35: CPT

## 2023-10-22 RX ORDER — NIFEDIPINE 30 MG
30 TABLET, EXTENDED RELEASE 24 HR ORAL DAILY
Refills: 0 | Status: DISCONTINUED | OUTPATIENT
Start: 2023-10-22 | End: 2023-11-01

## 2023-10-22 RX ADMIN — POLYETHYLENE GLYCOL 3350 17 GRAM(S): 17 POWDER, FOR SOLUTION ORAL at 09:49

## 2023-10-22 RX ADMIN — MIRTAZAPINE 7.5 MILLIGRAM(S): 45 TABLET, ORALLY DISINTEGRATING ORAL at 21:15

## 2023-10-22 RX ADMIN — Medication 0.5 MILLIGRAM(S): at 21:15

## 2023-10-22 RX ADMIN — Medication 1 PATCH: at 09:49

## 2023-10-22 RX ADMIN — Medication 30 MILLIGRAM(S): at 19:43

## 2023-10-22 RX ADMIN — LOSARTAN POTASSIUM 50 MILLIGRAM(S): 100 TABLET, FILM COATED ORAL at 09:49

## 2023-10-22 RX ADMIN — Medication 50 MILLIGRAM(S): at 21:15

## 2023-10-22 RX ADMIN — ESCITALOPRAM OXALATE 10 MILLIGRAM(S): 10 TABLET, FILM COATED ORAL at 09:49

## 2023-10-22 NOTE — BH INPATIENT PSYCHIATRY PROGRESS NOTE - NSBHMETABOLIC_PSY_ALL_CORE_FT
BMI: BMI (kg/m2): 18.3 (10-16-23 @ 14:29)  HbA1c: A1C with Estimated Average Glucose Result: 5.2 % (10-18-23 @ 14:50)    Glucose:   BP: 167/97 (10-22-23 @ 16:25) (146/81 - 176/109)  Lipid Panel: Date/Time: 10-18-23 @ 14:50  Cholesterol, Serum: 249  Direct LDL: --  HDL Cholesterol, Serum: 54  Total Cholesterol/HDL Ration Measurement: --  Triglycerides, Serum: 115

## 2023-10-22 NOTE — BH INPATIENT PSYCHIATRY PROGRESS NOTE - CURRENT MEDICATION
MEDICATIONS  (STANDING):  clonazePAM  Tablet 0.5 milliGRAM(s) Oral at bedtime  escitalopram 10 milliGRAM(s) Oral daily  influenza  Vaccine (HIGH DOSE) 0.7 milliLiter(s) IntraMuscular once  losartan 50 milliGRAM(s) Oral daily  mirtazapine 7.5 milliGRAM(s) Oral at bedtime  nicotine - 21 mG/24Hr(s) Patch 1 Patch Transdermal daily  NIFEdipine XL 30 milliGRAM(s) Oral daily  polyethylene glycol 3350 17 Gram(s) Oral daily    MEDICATIONS  (PRN):  LORazepam     Tablet 1 milliGRAM(s) Oral every 6 hours PRN agitation/benzo withdrawal  nicotine  Polacrilex Gum 2 milliGRAM(s) Oral every 2 hours PRN nicotine dependence without withdrawal  traZODone 50 milliGRAM(s) Oral at bedtime PRN insomnia

## 2023-10-22 NOTE — PROGRESS NOTE ADULT - ASSESSMENT
77 y/o f with PMH of depression presents to ED with suicidal attempt by overdose. Medicine consulted for hypertension.     #HTN  - /85 s/p Losartan 50mg and repeat /89. SBP ~ 150-160's throughout the day  - c/w Losartan 50mg po daily   - add Nidefipine ER 30mg po daily   - monitor BP    # smoking   - started nicotine patch 21mg/d for one week ( 10/20-) the taper 14mg/d for one week then 7gm/d for one week   - smoking cessation advised     #Weight Loss  patient states has had decreased appetite 2/2 stress. has lost weight over the past few months  - started Ensure chocolate flavor 2 bottles a day  -  RN ok for friend to bring in Boost chocolate flavor and they can be kept in the [employer] [facility]    Med consult will continue to follow.
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
## 2023-10-22 NOTE — BH INPATIENT PSYCHIATRY PROGRESS NOTE - NSBHASSESSSUMMFT_PSY_ALL_CORE
Patient is a 78 year old female with PPH of depression who remains with passive SI secondary to situational stressors and hopelessness. She reports increased anxiety and worse sleep secondary to agitation on the unit. She appears to be slightly more hopeful about her situational stressors though she continues to exhibit depressed mood and SI. She would benefit from continued observation and treatment on the unit.

## 2023-10-22 NOTE — BH INPATIENT PSYCHIATRY PROGRESS NOTE - NSBHCHARTREVIEWVS_PSY_A_CORE FT
Vital Signs Last 24 Hrs  T(C): 36.8 (10-22-23 @ 16:25), Max: 36.9 (10-22-23 @ 10:00)  T(F): 98.2 (10-22-23 @ 16:25), Max: 98.4 (10-22-23 @ 10:00)  HR: 72 (10-22-23 @ 16:25) (71 - 88)  BP: 167/97 (10-22-23 @ 16:25) (156/89 - 167/97)  BP(mean): --  RR: 18 (10-22-23 @ 16:25) (17 - 18)  SpO2: 96% (10-22-23 @ 16:25) (95% - 97%)

## 2023-10-22 NOTE — PROGRESS NOTE ADULT - SUBJECTIVE AND OBJECTIVE BOX
Patient is a 78y old  Female who presents with a chief complaint of     INTERVAL EVENTS: NAEON    SUBJECTIVE:  Patient was seen and examined at bedside. no complaints     Review of systems: No fever, chills, dizziness, HA, Changes in vision, CP, dyspnea, nausea or vomiting, dysuria, changes in bowel movements, LE edema. Rest of 12 point Review of systems negative unless otherwise documented elsewhere in note.     Diet, Regular (10-21-23 @ 17:44) [Active]  Diet, DASH/TLC:   Sodium & Cholesterol Restricted  Supplement Feeding Modality:  Oral  Ensure Enlive Cans or Servings Per Day:  2       Frequency:  Two Times a day (10-21-23 @ 12:03) [Available for Activation]      MEDICATIONS:  MEDICATIONS  (STANDING):  clonazePAM  Tablet 0.5 milliGRAM(s) Oral at bedtime  escitalopram 10 milliGRAM(s) Oral daily  influenza  Vaccine (HIGH DOSE) 0.7 milliLiter(s) IntraMuscular once  losartan 50 milliGRAM(s) Oral daily  mirtazapine 7.5 milliGRAM(s) Oral at bedtime  nicotine - 21 mG/24Hr(s) Patch 1 Patch Transdermal daily  polyethylene glycol 3350 17 Gram(s) Oral daily    MEDICATIONS  (PRN):  LORazepam     Tablet 1 milliGRAM(s) Oral every 6 hours PRN agitation/benzo withdrawal  nicotine  Polacrilex Gum 2 milliGRAM(s) Oral every 2 hours PRN nicotine dependence without withdrawal  traZODone 50 milliGRAM(s) Oral at bedtime PRN insomnia      Allergies    penicillin (Hives)  tetracycline (Hives)    Intolerances        OBJECTIVE:  Vital Signs Last 24 Hrs  T(C): 36.9 (22 Oct 2023 10:00), Max: 37.4 (21 Oct 2023 16:38)  T(F): 98.4 (22 Oct 2023 10:00), Max: 99.4 (21 Oct 2023 16:38)  HR: 88 (22 Oct 2023 10:00) (71 - 88)  BP: 156/89 (22 Oct 2023 10:00) (154/89 - 163/77)  BP(mean): --  RR: 18 (22 Oct 2023 08:50) (17 - 18)  SpO2: 97% (22 Oct 2023 08:50) (95% - 97%)    Parameters below as of 22 Oct 2023 08:50  Patient On (Oxygen Delivery Method): room air      I&O's Summary      PHYSICAL EXAM:  Gen: Reclining in bed at time of exam, appears stated age  HEENT: NCAT, MMM, clear OP  Neck: supple, trachea at midline  CV: RRR, +S1/S2  Pulm: adequate respiratory effort, no increase in work of breathing  Abd: soft, NTND  Skin: warm and dry,   Ext: WWP, no LE edema  Neuro: AOx3, no gross focal neurological deficits  Psych: affect and behavior appropriate, pleasant at time of interview  :     LABS:    10-20    135  |  99  |  22  ----------------------------<  102<H>  4.1   |  28  |  1.15    Ca    9.3      20 Oct 2023 16:35          CAPILLARY BLOOD GLUCOSE        Urinalysis Basic - ( 20 Oct 2023 16:35 )    Color: x / Appearance: x / SG: x / pH: x  Gluc: 102 mg/dL / Ketone: x  / Bili: x / Urobili: x   Blood: x / Protein: x / Nitrite: x   Leuk Esterase: x / RBC: x / WBC x   Sq Epi: x / Non Sq Epi: x / Bacteria: x        MICRODATA:      RADIOLOGY/OTHER STUDIES:    PCP  Pharmacy:   Emergency contact:

## 2023-10-23 PROCEDURE — 99232 SBSQ HOSP IP/OBS MODERATE 35: CPT

## 2023-10-23 RX ORDER — MIRTAZAPINE 45 MG/1
15 TABLET, ORALLY DISINTEGRATING ORAL AT BEDTIME
Refills: 0 | Status: DISCONTINUED | OUTPATIENT
Start: 2023-10-23 | End: 2023-11-01

## 2023-10-23 RX ORDER — TRAZODONE HCL 50 MG
50 TABLET ORAL AT BEDTIME
Refills: 0 | Status: DISCONTINUED | OUTPATIENT
Start: 2023-10-23 | End: 2023-10-30

## 2023-10-23 RX ADMIN — Medication 1 PATCH: at 09:00

## 2023-10-23 RX ADMIN — MIRTAZAPINE 15 MILLIGRAM(S): 45 TABLET, ORALLY DISINTEGRATING ORAL at 21:13

## 2023-10-23 RX ADMIN — Medication 0.5 MILLIGRAM(S): at 21:13

## 2023-10-23 RX ADMIN — LOSARTAN POTASSIUM 50 MILLIGRAM(S): 100 TABLET, FILM COATED ORAL at 11:07

## 2023-10-23 RX ADMIN — Medication 30 MILLIGRAM(S): at 11:07

## 2023-10-23 RX ADMIN — Medication 1 PATCH: at 11:08

## 2023-10-23 RX ADMIN — ESCITALOPRAM OXALATE 10 MILLIGRAM(S): 10 TABLET, FILM COATED ORAL at 11:07

## 2023-10-23 RX ADMIN — Medication 50 MILLIGRAM(S): at 21:14

## 2023-10-23 RX ADMIN — Medication 1 PATCH: at 18:13

## 2023-10-23 NOTE — BH INPATIENT PSYCHIATRY PROGRESS NOTE - NSBHMETABOLIC_PSY_ALL_CORE_FT
BMI: BMI (kg/m2): 18.3 (10-16-23 @ 14:29)  HbA1c: A1C with Estimated Average Glucose Result: 5.2 % (10-18-23 @ 14:50)    Glucose:   BP: 155/90 (10-23-23 @ 08:07) (154/89 - 174/95)  Lipid Panel: Date/Time: 10-18-23 @ 14:50  Cholesterol, Serum: 249  Direct LDL: --  HDL Cholesterol, Serum: 54  Total Cholesterol/HDL Ration Measurement: --  Triglycerides, Serum: 115   BMI: BMI (kg/m2): 18.3 (10-16-23 @ 14:29)  HbA1c: A1C with Estimated Average Glucose Result: 5.2 % (10-18-23 @ 14:50)    Glucose:   BP: 144/83 (10-23-23 @ 16:33) (144/83 - 174/95)  Lipid Panel: Date/Time: 10-18-23 @ 14:50  Cholesterol, Serum: 249  Direct LDL: --  HDL Cholesterol, Serum: 54  Total Cholesterol/HDL Ration Measurement: --  Triglycerides, Serum: 115

## 2023-10-23 NOTE — BH INPATIENT PSYCHIATRY PROGRESS NOTE - NSBHFUPINTERVALHXFT_PSY_A_CORE
Patient seen by writer, HUMERA Mckay, and . Patient pleasant, cooperative, with good eye contact. Patient seen throughout the day attending groups, socializing with peers, and interacting with her visitors. Patient states she has no optimism for the future, but says with each meeting and each day it is getting easier to see some light of what the future will look like. Patient states she will never try to end her life again. When asked about what will make her life fulfilled in the future, she talks about going back to volunteering at [employer] [employer] and finding ways to continue with her social life with her current finances. Social work discussed partial hospital program and intensive outpatient therapy as a bridge once she is discharged and the patient was open to the possibilities but seemed stressed when it was discussed that she may need to travel outside of [place] for those programs. She discusses how she is feeling more hopeful now that her friends are helping her sort out her finances, stating that her apartment renovations might be done within the next week. She states that her friends are her support group and she will have them to lean on as support once she is discharged. Patient endorses a better appetite the past few days, but states that she is having difficulty sleeping. She states that she is able to fall asleep easily after taking her nightly meds but then three hours later she is wide awake and has difficulty falling back asleep. Patient is also complaining of an upset stomach, stating that she is regularly nauseous to the point of feeling like she has to vomit. Patient also said that she did not take her Miralax today because she had one episode of diarrhea. Denies active si/hi/avh or PI. Patient currently asking if she could receive the influenza and Covid-19 vaccine while she is here.   Medicine consult added 30mg of nifedipine orally once daily on top of the 50mg losartan orally once daily for hypertension.   Mirtazapine will be increased from 7.5mg to 15mg and a PRN of 50mg of Trazadone on top of the standing 50mg of Trazadone will be added for sleep.

## 2023-10-23 NOTE — BH INPATIENT PSYCHIATRY PROGRESS NOTE - NSBHCHARTREVIEWVS_PSY_A_CORE FT
Vital Signs Last 24 Hrs  T(C): 36.8 (10-23-23 @ 08:07), Max: 36.8 (10-22-23 @ 16:25)  T(F): 98.2 (10-23-23 @ 08:07), Max: 98.2 (10-22-23 @ 16:25)  HR: 79 (10-23-23 @ 08:07) (72 - 79)  BP: 155/90 (10-23-23 @ 08:07) (155/90 - 167/97)  BP(mean): --  RR: 18 (10-22-23 @ 16:25) (18 - 18)  SpO2: 98% (10-23-23 @ 08:07) (96% - 98%)     Vital Signs Last 24 Hrs  T(C): 37.3 (10-23-23 @ 16:33), Max: 37.3 (10-23-23 @ 16:33)  T(F): 99.2 (10-23-23 @ 16:33), Max: 99.2 (10-23-23 @ 16:33)  HR: 93 (10-23-23 @ 16:33) (79 - 93)  BP: 144/83 (10-23-23 @ 16:33) (144/83 - 155/90)  BP(mean): --  RR: 18 (10-23-23 @ 16:33) (18 - 18)  SpO2: 98% (10-23-23 @ 16:33) (98% - 98%)

## 2023-10-23 NOTE — BH INPATIENT PSYCHIATRY PROGRESS NOTE - CURRENT MEDICATION
MEDICATIONS  (STANDING):  clonazePAM  Tablet 0.5 milliGRAM(s) Oral at bedtime  escitalopram 10 milliGRAM(s) Oral daily  influenza  Vaccine (HIGH DOSE) 0.7 milliLiter(s) IntraMuscular once  losartan 50 milliGRAM(s) Oral daily  mirtazapine 7.5 milliGRAM(s) Oral at bedtime  nicotine - 21 mG/24Hr(s) Patch 1 Patch Transdermal daily  NIFEdipine XL 30 milliGRAM(s) Oral daily  polyethylene glycol 3350 17 Gram(s) Oral daily    MEDICATIONS  (PRN):  LORazepam     Tablet 1 milliGRAM(s) Oral every 6 hours PRN agitation/benzo withdrawal  nicotine  Polacrilex Gum 2 milliGRAM(s) Oral every 2 hours PRN nicotine dependence without withdrawal  traZODone 50 milliGRAM(s) Oral at bedtime PRN insomnia   MEDICATIONS  (STANDING):  clonazePAM  Tablet 0.5 milliGRAM(s) Oral at bedtime  escitalopram 10 milliGRAM(s) Oral daily  influenza  Vaccine (HIGH DOSE) 0.7 milliLiter(s) IntraMuscular once  losartan 50 milliGRAM(s) Oral daily  mirtazapine 15 milliGRAM(s) Oral at bedtime  nicotine - 21 mG/24Hr(s) Patch 1 Patch Transdermal daily  NIFEdipine XL 30 milliGRAM(s) Oral daily  polyethylene glycol 3350 17 Gram(s) Oral daily  traZODone 50 milliGRAM(s) Oral at bedtime    MEDICATIONS  (PRN):  LORazepam     Tablet 1 milliGRAM(s) Oral every 6 hours PRN agitation/benzo withdrawal  nicotine  Polacrilex Gum 2 milliGRAM(s) Oral every 2 hours PRN nicotine dependence without withdrawal  traZODone 50 milliGRAM(s) Oral at bedtime PRN insomnia

## 2023-10-23 NOTE — PROGRESS NOTE ADULT - ASSESSMENT
79 y/o f with PMH of depression presents to ED with suicidal attempt by overdose. Medicine consulted for hypertension.     #HTN  - blood pressures continued to be elevated  - c/w Losartan 50mg po daily   - INCREASE Nidefipine ER 30mg po daily   - monitor BP    #smoking   - started nicotine patch 21mg/d for one week ( 10/20-) the taper 14mg/d for one week then 7gm/d for one week   - smoking cessation advised     #Weight Loss  patient states has had decreased appetite 2/2 stress. has lost weight over the past few months  - started Ensure chocolate flavor 2 bottles a day  -  RN ok for friend to bring in Boost chocolate flavor and they can be kept in the [employer] fridge    Med consult will continue to follow.

## 2023-10-23 NOTE — PROGRESS NOTE ADULT - SUBJECTIVE AND OBJECTIVE BOX
O/N Events: naeo    Subjective/ROS: Patient seen and examined at bedside.     Denies Fever/Chills, HA, CP, SOB, n/v, changes in bowel/urinary habits.  12pt ROS otherwise negative.    VITALS  Vital Signs Last 24 Hrs  T(C): 36.8 (23 Oct 2023 08:07), Max: 36.8 (22 Oct 2023 16:25)  T(F): 98.2 (23 Oct 2023 08:07), Max: 98.2 (22 Oct 2023 16:25)  HR: 79 (23 Oct 2023 08:07) (72 - 79)  BP: 155/90 (23 Oct 2023 08:07) (155/90 - 167/97)  BP(mean): --  RR: 18 (22 Oct 2023 16:25) (18 - 18)  SpO2: 98% (23 Oct 2023 08:07) (96% - 98%)    Parameters below as of 23 Oct 2023 08:07  Patient On (Oxygen Delivery Method): room air        CAPILLARY BLOOD GLUCOSE          PHYSICAL EXAM  General: NAD  Neurological: A&Ox3, CNII-XII grossly intact; no obvious focal deficits    MEDICATIONS  (STANDING):  clonazePAM  Tablet 0.5 milliGRAM(s) Oral at bedtime  escitalopram 10 milliGRAM(s) Oral daily  influenza  Vaccine (HIGH DOSE) 0.7 milliLiter(s) IntraMuscular once  losartan 50 milliGRAM(s) Oral daily  mirtazapine 7.5 milliGRAM(s) Oral at bedtime  nicotine - 21 mG/24Hr(s) Patch 1 Patch Transdermal daily  NIFEdipine XL 30 milliGRAM(s) Oral daily  polyethylene glycol 3350 17 Gram(s) Oral daily    MEDICATIONS  (PRN):  LORazepam     Tablet 1 milliGRAM(s) Oral every 6 hours PRN agitation/benzo withdrawal  nicotine  Polacrilex Gum 2 milliGRAM(s) Oral every 2 hours PRN nicotine dependence without withdrawal  traZODone 50 milliGRAM(s) Oral at bedtime PRN insomnia      penicillin (Hives)  tetracycline (Hives)      LABS                      IMAGING/EKG/ETC

## 2023-10-23 NOTE — BH INPATIENT PSYCHIATRY PROGRESS NOTE - NSBHASSESSSUMMFT_PSY_ALL_CORE
This is a 77y/o  female, , retired, domiciled in apartment alone. No pertinent medical hx. Psychiatric hx of reported depression, anxiety, insomnia, currently in treatment with Dr. Stevens (remeron, klonopin, trazodone). No prior psychiatric hospitalizations, no hx of SA or NSSIB. No hx of drug/etoh abuse. Hx of trauma in childhood. No current legal issues. Patient presents to ED at recommendation of her psychiatrist after disclosing to him that she had overdosed on her medications the night before- a combination of remeron, klonopin and trazodone. Utox negative for all substances. Patient admitted to [facility] 2p.    -Medicine recs re elevated bp are appreciated. Losartan 25mg started  -Lexapro 5mg qd increased to 10mg, will continue to titrate as appropriate  -Klonopin 0.5mg qhs  -Trazodone 50mg qhs prn insomnia  -Remeron 7.5mg qd  -Collateral to be attained from pt's psychiatrist This is a 79y/o  female, , retired, domiciled in apartment alone. No pertinent medical hx. Psychiatric hx of reported depression, anxiety, insomnia, currently in treatment with Dr. Stevens (remeron, klonopin, trazodone). No prior psychiatric hospitalizations, no hx of SA or NSSIB. No hx of drug/etoh abuse. Hx of trauma in childhood. No current legal issues. Patient presents to ED at recommendation of her psychiatrist after disclosing to him that she had overdosed on her medications the night before- a combination of remeron, klonopin and trazodone. Utox negative for all substances. Patient admitted to [address] [place] [address].    -Medicine recs re elevated bp are appreciated. Losartan 25mg started  -Lexapro 10mg, will continue to titrate as appropriate  -Klonopin 0.5mg qhs  -Trazodone 50mg qhs/ 50mg qhs prn insomnia  -Remeron 7.5mg qd

## 2023-10-24 PROCEDURE — 99232 SBSQ HOSP IP/OBS MODERATE 35: CPT | Mod: GC

## 2023-10-24 PROCEDURE — 99232 SBSQ HOSP IP/OBS MODERATE 35: CPT

## 2023-10-24 RX ORDER — CLONAZEPAM 1 MG
0.5 TABLET ORAL AT BEDTIME
Refills: 0 | Status: DISCONTINUED | OUTPATIENT
Start: 2023-10-24 | End: 2023-10-30

## 2023-10-24 RX ORDER — ONDANSETRON 8 MG/1
4 TABLET, FILM COATED ORAL ONCE
Refills: 0 | Status: DISCONTINUED | OUTPATIENT
Start: 2023-10-24 | End: 2023-11-01

## 2023-10-24 RX ADMIN — Medication 1 PATCH: at 14:11

## 2023-10-24 RX ADMIN — Medication 30 MILLIGRAM(S): at 11:14

## 2023-10-24 RX ADMIN — ESCITALOPRAM OXALATE 10 MILLIGRAM(S): 10 TABLET, FILM COATED ORAL at 11:14

## 2023-10-24 RX ADMIN — Medication 0.5 MILLIGRAM(S): at 21:00

## 2023-10-24 RX ADMIN — MIRTAZAPINE 15 MILLIGRAM(S): 45 TABLET, ORALLY DISINTEGRATING ORAL at 20:59

## 2023-10-24 RX ADMIN — Medication 1 PATCH: at 11:13

## 2023-10-24 RX ADMIN — Medication 50 MILLIGRAM(S): at 21:00

## 2023-10-24 RX ADMIN — Medication 1 PATCH: at 11:00

## 2023-10-24 RX ADMIN — LOSARTAN POTASSIUM 50 MILLIGRAM(S): 100 TABLET, FILM COATED ORAL at 11:14

## 2023-10-24 NOTE — BH INPATIENT PSYCHIATRY PROGRESS NOTE - NSBHASSESSSUMMFT_PSY_ALL_CORE
This is a 79y/o  female, , retired, domiciled in apartment alone. No pertinent medical hx. Psychiatric hx of reported depression, anxiety, insomnia, currently in treatment with Dr. Stevens (remeron, klonopin, trazodone). No prior psychiatric hospitalizations, no hx of SA or NSSIB. No hx of drug/etoh abuse. Hx of trauma in childhood. No current legal issues. Patient presents to ED at recommendation of her psychiatrist after disclosing to him that she had overdosed on her medications the night before- a combination of remeron, klonopin and trazodone. Utox negative for all substances. Patient admitted to [address] [place] [address].    -Medicine recs re elevated bp are appreciated: Losartan 50mg qd, Nifedipine 30mg qd  -Lexapro 10mg, will continue to titrate as appropriate  -Klonopin 0.5mg qhs  -Trazodone 50mg qhs/ 50mg qhs prn insomnia  -Remeron 15mg qd

## 2023-10-24 NOTE — PROGRESS NOTE ADULT - SUBJECTIVE AND OBJECTIVE BOX
SUBJECTIVE:  Patient seen and examined at bedside.  Endorsed new "rash" on L dorsal hand. No other complaints. Reviewed patient's BP log.    Vital Signs Last 12 Hrs  T(F): 98 (10-24-23 @ 08:53), Max: 98 (10-24-23 @ 08:53)  HR: 81 (10-24-23 @ 08:53) (81 - 81)  BP: 141/83 (10-24-23 @ 08:53) (141/83 - 141/83)  BP(mean): --  RR: 18 (10-24-23 @ 08:53) (18 - 18)  SpO2: 97% (10-24-23 @ 08:53) (97% - 97%)  I&O's Summary    PHYSICAL EXAM:  General: NAD  Neurological: A&Ox3, CNII-XII grossly intact; no obvious focal deficits    LABS:    RADIOLOGY & ADDITIONAL TESTS:    MEDICATIONS  (STANDING):  clonazePAM  Tablet 0.5 milliGRAM(s) Oral at bedtime  escitalopram 10 milliGRAM(s) Oral daily  influenza  Vaccine (HIGH DOSE) 0.7 milliLiter(s) IntraMuscular once  losartan 50 milliGRAM(s) Oral daily  mirtazapine 15 milliGRAM(s) Oral at bedtime  nicotine - 21 mG/24Hr(s) Patch 1 Patch Transdermal daily  NIFEdipine XL 30 milliGRAM(s) Oral daily  polyethylene glycol 3350 17 Gram(s) Oral daily  traZODone 50 milliGRAM(s) Oral at bedtime    MEDICATIONS  (PRN):  nicotine  Polacrilex Gum 2 milliGRAM(s) Oral every 2 hours PRN nicotine dependence without withdrawal  ondansetron    Tablet 4 milliGRAM(s) Oral once PRN nausea  traZODone 50 milliGRAM(s) Oral at bedtime PRN insomnia

## 2023-10-24 NOTE — BH INPATIENT PSYCHIATRY PROGRESS NOTE - CURRENT MEDICATION
MEDICATIONS  (STANDING):  clonazePAM  Tablet 0.5 milliGRAM(s) Oral at bedtime  escitalopram 10 milliGRAM(s) Oral daily  influenza  Vaccine (HIGH DOSE) 0.7 milliLiter(s) IntraMuscular once  losartan 50 milliGRAM(s) Oral daily  mirtazapine 15 milliGRAM(s) Oral at bedtime  nicotine - 21 mG/24Hr(s) Patch 1 Patch Transdermal daily  NIFEdipine XL 30 milliGRAM(s) Oral daily  polyethylene glycol 3350 17 Gram(s) Oral daily  traZODone 50 milliGRAM(s) Oral at bedtime    MEDICATIONS  (PRN):  nicotine  Polacrilex Gum 2 milliGRAM(s) Oral every 2 hours PRN nicotine dependence without withdrawal  ondansetron    Tablet 4 milliGRAM(s) Oral once PRN nausea  traZODone 50 milliGRAM(s) Oral at bedtime PRN insomnia

## 2023-10-24 NOTE — BH INPATIENT PSYCHIATRY PROGRESS NOTE - NSBHFUPINTERVALHXFT_PSY_A_CORE
Patient visible on the unit, approaches writer on sight to share that the changes to her sleep regimen (increase in both trazodone and remeron) were effective. She states that sleep was very good last night and as a result she feels much better. She shared feeling more optimistic and hopeful after our conversation yesterday as she was able to 'see the light at the end of the tunnel' as he financial situation and apartment are being addressed by friend, and friend level of support has increased since admission. No si/hi/avh or PI endorsed.   Overall appears pleasant, polite, interactive, euthymic, very appropriate. No acute medical concerns, tolerating recent changes to med regimen as per medicine consult team re her blood pressure. She requests that miralax be discontinued as she is no longer constipated.   MAR reviewed, pt has been compliant with medication regimen. Vitals reviewed.

## 2023-10-24 NOTE — BH INPATIENT PSYCHIATRY PROGRESS NOTE - NSBHMETABOLIC_PSY_ALL_CORE_FT
BMI: BMI (kg/m2): 18.3 (10-16-23 @ 14:29)  HbA1c: A1C with Estimated Average Glucose Result: 5.2 % (10-18-23 @ 14:50)    Glucose:   BP: 141/83 (10-24-23 @ 08:53) (141/83 - 167/97)  Lipid Panel: Date/Time: 10-18-23 @ 14:50  Cholesterol, Serum: 249  Direct LDL: --  HDL Cholesterol, Serum: 54  Total Cholesterol/HDL Ration Measurement: --  Triglycerides, Serum: 115

## 2023-10-24 NOTE — BH INPATIENT PSYCHIATRY PROGRESS NOTE - NSBHCHARTREVIEWVS_PSY_A_CORE FT
Vital Signs Last 24 Hrs  T(C): 36.7 (10-24-23 @ 08:53), Max: 37.3 (10-23-23 @ 16:33)  T(F): 98 (10-24-23 @ 08:53), Max: 99.2 (10-23-23 @ 16:33)  HR: 81 (10-24-23 @ 08:53) (81 - 93)  BP: 141/83 (10-24-23 @ 08:53) (141/83 - 144/83)  BP(mean): --  RR: 18 (10-24-23 @ 08:53) (18 - 18)  SpO2: 97% (10-24-23 @ 08:53) (97% - 98%)

## 2023-10-24 NOTE — PROGRESS NOTE ADULT - ASSESSMENT
77 y/o f with PMH of depression presents to ED with suicidal attempt by overdose. Medicine consulted for hypertension.     #Rash  1x 3mm petechiae on L dorsal hand noticed today. Patient wears long sleeve shirt with trousers with only b/l hands and face exposed. Unclear if from insect bite.  - continue to monitor skin    #HTN  BP improved. Today 141/83  - c/w Losartan 50mg & Nidefipine ER 30mg Qd  - monitor BP    #Smoking   - started nicotine patch 21mg/d for one week ( 10/20-) the taper 14mg/d for one week then 7gm/d for one week   - smoking cessation advised     #Weight Loss  patient states has had decreased appetite 2/2 stress. has lost weight over the past few months  - started Ensure chocolate flavor 2 bottles a day  -  RN ok for friend to bring in Boost chocolate flavor and they can be kept in the [employer] fridge    Med consult will continue to follow.

## 2023-10-25 PROCEDURE — 99232 SBSQ HOSP IP/OBS MODERATE 35: CPT

## 2023-10-25 PROCEDURE — 99232 SBSQ HOSP IP/OBS MODERATE 35: CPT | Mod: GC

## 2023-10-25 RX ORDER — ACETAMINOPHEN 500 MG
650 TABLET ORAL EVERY 6 HOURS
Refills: 0 | Status: DISCONTINUED | OUTPATIENT
Start: 2023-10-25 | End: 2023-11-01

## 2023-10-25 RX ADMIN — Medication 650 MILLIGRAM(S): at 17:57

## 2023-10-25 RX ADMIN — LOSARTAN POTASSIUM 50 MILLIGRAM(S): 100 TABLET, FILM COATED ORAL at 10:07

## 2023-10-25 RX ADMIN — Medication 50 MILLIGRAM(S): at 21:11

## 2023-10-25 RX ADMIN — ESCITALOPRAM OXALATE 10 MILLIGRAM(S): 10 TABLET, FILM COATED ORAL at 10:07

## 2023-10-25 RX ADMIN — Medication 650 MILLIGRAM(S): at 19:14

## 2023-10-25 RX ADMIN — Medication 1 PATCH: at 10:08

## 2023-10-25 RX ADMIN — Medication 0.5 MILLIGRAM(S): at 21:11

## 2023-10-25 RX ADMIN — MIRTAZAPINE 15 MILLIGRAM(S): 45 TABLET, ORALLY DISINTEGRATING ORAL at 21:11

## 2023-10-25 RX ADMIN — INFLUENZA VIRUS VACCINE 0.7 MILLILITER(S): 15; 15; 15; 15 SUSPENSION INTRAMUSCULAR at 13:58

## 2023-10-25 RX ADMIN — Medication 1 PATCH: at 07:00

## 2023-10-25 RX ADMIN — Medication 30 MILLIGRAM(S): at 10:07

## 2023-10-25 RX ADMIN — Medication 1 PATCH: at 10:07

## 2023-10-25 NOTE — BH INPATIENT PSYCHIATRY PROGRESS NOTE - NSBHFUPINTERVALHXFT_PSY_A_CORE
Patient visible on the unit, approaches writer and Kristel on sight. She shares concern that one of her close girlfriends is telling people about her circumstances -financial and psychiatric which is concerning and bothersome to her. She sought advice from treatment team how to best proceed as this is also one of the friends that is helping with the selling of her apartment. She otherwise shared the development of a maculopapular rash on left hand and left foot since yesterday, not itchy or painful in nature- will continue to monitor. Medicine consult continues to follow for HTN and recs are appreciated. vitals reviewed and pt has no further medical concerns. She is anticipating flu shot today. Sleep last night was poor as she slept deeply for 2 hours but was up and lightly sleeping until morning. Pt encouraged to request appropriate prns tonight if needed. Mood is otherwise reported as fair. She is discharge focused and anticipating discharge for next week. She is hoping to speak with her outpt psychiatrist to provide update for treatment.   Pt overall is pleasant, well related, well spoken, fuutre oriented, endorsing improvement in mood and sxs. Focused on discharge and aftercare treatment. MAR reviewed and pt is compliant with all meds  Per staff report pt remains visible on the unit, attending select groups

## 2023-10-25 NOTE — BH INPATIENT PSYCHIATRY PROGRESS NOTE - NSBHASSESSSUMMFT_PSY_ALL_CORE
This is a 79y/o  female, , retired, domiciled in apartment alone. No pertinent medical hx. Psychiatric hx of reported depression, anxiety, insomnia, currently in treatment with Dr. Stevens (remeron, klonopin, trazodone). No prior psychiatric hospitalizations, no hx of SA or NSSIB. No hx of drug/etoh abuse. Hx of trauma in childhood. No current legal issues. Patient presents to ED at recommendation of her psychiatrist after disclosing to him that she had overdosed on her medications the night before- a combination of remeron, klonopin and trazodone. Utox negative for all substances. Patient admitted to [address] [place] [address].    -Medicine recs re elevated bp are appreciated: Losartan 50mg qd, Nifedipine 30mg qd  -Lexapro 10mg, will continue to titrate as appropriate  -Klonopin 0.5mg qhs  -Trazodone 50mg qhs/ 50mg qhs prn insomnia  -Remeron 15mg qd

## 2023-10-25 NOTE — BH INPATIENT PSYCHIATRY PROGRESS NOTE - NSBHMETABOLIC_PSY_ALL_CORE_FT
BMI: BMI (kg/m2): 18.3 (10-16-23 @ 14:29)  HbA1c: A1C with Estimated Average Glucose Result: 5.2 % (10-18-23 @ 14:50)    Glucose:   BP: 141/81 (10-25-23 @ 08:32) (141/81 - 167/97)  Lipid Panel: Date/Time: 10-18-23 @ 14:50  Cholesterol, Serum: 249  Direct LDL: --  HDL Cholesterol, Serum: 54  Total Cholesterol/HDL Ration Measurement: --  Triglycerides, Serum: 115

## 2023-10-25 NOTE — BH INPATIENT PSYCHIATRY PROGRESS NOTE - NSBHCHARTREVIEWVS_PSY_A_CORE FT
Vital Signs Last 24 Hrs  T(C): 37.1 (10-25-23 @ 08:32), Max: 37.2 (10-24-23 @ 16:18)  T(F): 98.7 (10-25-23 @ 08:32), Max: 98.9 (10-24-23 @ 16:18)  HR: 81 (10-25-23 @ 08:32) (81 - 87)  BP: 141/81 (10-25-23 @ 08:32) (141/81 - 152/91)  BP(mean): --  RR: 18 (10-25-23 @ 08:32) (18 - 18)  SpO2: 95% (10-25-23 @ 08:32) (95% - 96%)

## 2023-10-25 NOTE — PROGRESS NOTE ADULT - SUBJECTIVE AND OBJECTIVE BOX
CC: Patient is a 78y old  Female who presents with a chief complaint of     INTERVAL EVENTS: JESSICA    SUBJECTIVE / INTERVAL HPI: Patient seen and examined in hallway.  feeling well this AM     ROS: negative unless otherwise stated above.    VITAL SIGNS:  Vital Signs Last 24 Hrs  T(C): 37.1 (25 Oct 2023 08:32), Max: 37.2 (24 Oct 2023 16:18)  T(F): 98.7 (25 Oct 2023 08:32), Max: 98.9 (24 Oct 2023 16:18)  HR: 81 (25 Oct 2023 08:32) (81 - 87)  BP: 141/81 (25 Oct 2023 08:32) (141/81 - 152/91)  BP(mean): --  RR: 18 (25 Oct 2023 08:32) (18 - 18)  SpO2: 95% (25 Oct 2023 08:32) (95% - 96%)    Parameters below as of 25 Oct 2023 08:32  Patient On (Oxygen Delivery Method): room air            PHYSICAL EXAM:    General: NAD  HEENT: MMM  Extremities: WWP; no edema, clubbing or cyanosis  Vascular: 2+ radial, DP/PT pulses B/L  Neurological: AAOx3    MEDICATIONS:  MEDICATIONS  (STANDING):  clonazePAM  Tablet 0.5 milliGRAM(s) Oral at bedtime  escitalopram 10 milliGRAM(s) Oral daily  influenza  Vaccine (HIGH DOSE) 0.7 milliLiter(s) IntraMuscular once  losartan 50 milliGRAM(s) Oral daily  mirtazapine 15 milliGRAM(s) Oral at bedtime  nicotine - 21 mG/24Hr(s) Patch 1 Patch Transdermal daily  NIFEdipine XL 30 milliGRAM(s) Oral daily  polyethylene glycol 3350 17 Gram(s) Oral daily  traZODone 50 milliGRAM(s) Oral at bedtime    MEDICATIONS  (PRN):  nicotine  Polacrilex Gum 2 milliGRAM(s) Oral every 2 hours PRN nicotine dependence without withdrawal  ondansetron    Tablet 4 milliGRAM(s) Oral once PRN nausea  traZODone 50 milliGRAM(s) Oral at bedtime PRN insomnia      ALLERGIES:  Allergies    penicillin (Hives)  tetracycline (Hives)    Intolerances        LABS:              CAPILLARY BLOOD GLUCOSE          RADIOLOGY & ADDITIONAL TESTS: Reviewed.

## 2023-10-25 NOTE — PROGRESS NOTE ADULT - ASSESSMENT
79 y/o f with PMH of depression presents to ED with suicidal attempt by overdose. Medicine consulted for hypertension.     #Rash  1x 3mm petechiae on L dorsal hand noticed today. Patient wears long sleeve shirt with trousers with only b/l hands and face exposed. Unclear if from insect bite.  - continue to monitor skin    #HTN  BP improved.  - c/w Losartan 50mg & Nidefipine ER 30mg Qd  - monitor BP    #Smoking   - started nicotine patch 21mg/d for one week ( 10/20-) the taper 14mg/d for one week then 7gm/d for one week   - smoking cessation advised     #Weight Loss  patient states has had decreased appetite 2/2 stress. has lost weight over the past few months  - started Ensure chocolate flavor 2 bottles a day  -  RN ok for friend to bring in Boost chocolate flavor and they can be kept in the [employer] fridge    Med consult will continue to follow.

## 2023-10-26 PROCEDURE — 99232 SBSQ HOSP IP/OBS MODERATE 35: CPT

## 2023-10-26 RX ADMIN — Medication 50 MILLIGRAM(S): at 21:43

## 2023-10-26 RX ADMIN — MIRTAZAPINE 15 MILLIGRAM(S): 45 TABLET, ORALLY DISINTEGRATING ORAL at 21:43

## 2023-10-26 RX ADMIN — Medication 1 PATCH: at 10:07

## 2023-10-26 RX ADMIN — Medication 0.5 MILLIGRAM(S): at 21:43

## 2023-10-26 RX ADMIN — LOSARTAN POTASSIUM 50 MILLIGRAM(S): 100 TABLET, FILM COATED ORAL at 10:07

## 2023-10-26 RX ADMIN — ESCITALOPRAM OXALATE 10 MILLIGRAM(S): 10 TABLET, FILM COATED ORAL at 10:07

## 2023-10-26 RX ADMIN — Medication 30 MILLIGRAM(S): at 10:06

## 2023-10-26 NOTE — PROGRESS NOTE ADULT - SUBJECTIVE AND OBJECTIVE BOX
CC: Patient is a 78y old  Female who presents with a chief complaint of     INTERVAL EVENTS: JESSICA    SUBJECTIVE / INTERVAL HPI: Patient seen and examined at bedside. feeling well this AM, rash better     ROS: negative unless otherwise stated above.    VITAL SIGNS:  Vital Signs Last 24 Hrs  T(C): 36.8 (26 Oct 2023 09:00), Max: 37.4 (25 Oct 2023 16:20)  T(F): 98.2 (26 Oct 2023 09:00), Max: 99.4 (25 Oct 2023 16:20)  HR: 76 (26 Oct 2023 09:00) (75 - 76)  BP: 143/82 (26 Oct 2023 09:00) (128/81 - 143/82)  BP(mean): --  RR: 18 (26 Oct 2023 09:00) (18 - 18)  SpO2: 96% (26 Oct 2023 09:00) (96% - 97%)    Parameters below as of 26 Oct 2023 09:00  Patient On (Oxygen Delivery Method): room air            PHYSICAL EXAM:    General: NAD  HEENT: MMM  Extremities: WWP; no edema, clubbing or cyanosis  Vascular: 2+ radial, DP/PT pulses B/L  Neurological: AAOx3    MEDICATIONS:  MEDICATIONS  (STANDING):  clonazePAM  Tablet 0.5 milliGRAM(s) Oral at bedtime  escitalopram 10 milliGRAM(s) Oral daily  losartan 50 milliGRAM(s) Oral daily  mirtazapine 15 milliGRAM(s) Oral at bedtime  nicotine - 21 mG/24Hr(s) Patch 1 Patch Transdermal daily  NIFEdipine XL 30 milliGRAM(s) Oral daily  polyethylene glycol 3350 17 Gram(s) Oral daily  traZODone 50 milliGRAM(s) Oral at bedtime    MEDICATIONS  (PRN):  acetaminophen     Tablet .. 650 milliGRAM(s) Oral every 6 hours PRN Moderate Pain (4 - 6)  nicotine  Polacrilex Gum 2 milliGRAM(s) Oral every 2 hours PRN nicotine dependence without withdrawal  ondansetron    Tablet 4 milliGRAM(s) Oral once PRN nausea  traZODone 50 milliGRAM(s) Oral at bedtime PRN insomnia      ALLERGIES:  Allergies    penicillin (Hives)  tetracycline (Hives)    Intolerances        LABS:              CAPILLARY BLOOD GLUCOSE          RADIOLOGY & ADDITIONAL TESTS: Reviewed.

## 2023-10-26 NOTE — BH INPATIENT PSYCHIATRY PROGRESS NOTE - NSBHMETABOLIC_PSY_ALL_CORE_FT
BMI: BMI (kg/m2): 18.3 (10-16-23 @ 14:29)  HbA1c: A1C with Estimated Average Glucose Result: 5.2 % (10-18-23 @ 14:50)    Glucose:   BP: 143/82 (10-26-23 @ 09:00) (128/81 - 152/91)  Lipid Panel: Date/Time: 10-18-23 @ 14:50  Cholesterol, Serum: 249  Direct LDL: --  HDL Cholesterol, Serum: 54  Total Cholesterol/HDL Ration Measurement: --  Triglycerides, Serum: 115

## 2023-10-26 NOTE — BH INPATIENT PSYCHIATRY PROGRESS NOTE - NSBHCHARTREVIEWVS_PSY_A_CORE FT
Vital Signs Last 24 Hrs  T(C): 36.8 (10-26-23 @ 09:00), Max: 37.4 (10-25-23 @ 16:20)  T(F): 98.2 (10-26-23 @ 09:00), Max: 99.4 (10-25-23 @ 16:20)  HR: 76 (10-26-23 @ 09:00) (75 - 76)  BP: 143/82 (10-26-23 @ 09:00) (128/81 - 143/82)  BP(mean): --  RR: 18 (10-26-23 @ 09:00) (18 - 18)  SpO2: 96% (10-26-23 @ 09:00) (96% - 97%)

## 2023-10-26 NOTE — BH INPATIENT PSYCHIATRY PROGRESS NOTE - NSCGIIMPROVESX_PSY_ALL_CORE
5 = Minimally worse - slightly worse but may not be clinically meaningful; may represent very little change in basic clinical status or functional capacity
2 = Much improved - notably better with signficant reduction of symptoms; increase in the level of functioning but some symptoms remain
2 = Much improved - notably better with signficant reduction of symptoms; increase in the level of functioning but some symptoms remain

## 2023-10-26 NOTE — BH INPATIENT PSYCHIATRY PROGRESS NOTE - NSBHFUPINTERVALHXFT_PSY_A_CORE
Patient visible on the unit, socializing with peers and staff, attending groups, and interacting with visitors. Patient seen resting in her room by writer and Dr. Villareal. Patient calm, cooperative, with good eye contact. She states that her mood is good today, stating "I am feeling better about the future because of all the help my friends have been giving me in selling my apartment." Patient states she had a good talk with her friend yesterday where they came to a resolution about her sharing her private details to others, stating that this is a relief and helped mitigate that stress. She says the maculopapular rash on left hand and left foot are still there, she talked to medicine about it and was advised to use cream to help with the rash.  Patient visible on the unit, socializing with peers and staff, attending groups, and interacting with visitors. Patient seen resting in her room by writer and Dr. Villareal. Patient calm, cooperative, with good eye contact. She states that her mood is good today, stating "I am feeling better about the future because of all the help my friends have been giving me in selling my apartment." Patient states she had a good talk with her friend yesterday where they came to a resolution about her sharing her private details to others, stating that this is a relief and helped mitigate that stress. She says the maculopapular rash on left hand and left foot are still there, she talked to medicine about it and was advised to use cream to help with the rash. Patient states she slept poorly last night, falling asleep easily and deeply for 4 hours but then waking and was not able to go back to sleep. She states she is no longer having feelings of wanting to end her life. Denies SI, HI, auditory/visual hallucinations. Per staff report, patient is less anxious, pleasant, and attends groups regularly.

## 2023-10-26 NOTE — BH INPATIENT PSYCHIATRY PROGRESS NOTE - NSCGISEVERILLNESS_PSY_ALL_CORE
4 = Moderately ill – overt symptoms causing noticeable, but modest, functional impairment or distress; symptom level may warrant medication
5 = Markedly ill - intrusive symptoms that distinctly impair social/occupational function or cause intrusive levels of distress
4 = Moderately ill – overt symptoms causing noticeable, but modest, functional impairment or distress; symptom level may warrant medication

## 2023-10-26 NOTE — BH INPATIENT PSYCHIATRY PROGRESS NOTE - NSBHCHARTREVIEWVS_PSY_A_CORE FT
Vital Signs Last 24 Hrs  T(C): 36.8 (10-26-23 @ 09:00), Max: 37.4 (10-25-23 @ 16:20)  T(F): 98.2 (10-26-23 @ 09:00), Max: 99.4 (10-25-23 @ 16:20)  HR: 76 (10-26-23 @ 09:00) (75 - 76)  BP: 143/82 (10-26-23 @ 09:00) (128/81 - 143/82)  BP(mean): --  RR: 18 (10-26-23 @ 09:00) (18 - 18)  SpO2: 96% (10-26-23 @ 09:00) (96% - 97%)     Vital Signs Last 24 Hrs  T(C): 37 (10-26-23 @ 19:55), Max: 37.2 (10-26-23 @ 16:25)  T(F): 98.6 (10-26-23 @ 19:55), Max: 98.9 (10-26-23 @ 16:25)  HR: 76 (10-26-23 @ 19:55) (76 - 82)  BP: 151/77 (10-26-23 @ 19:55) (143/82 - 154/81)  BP(mean): --  RR: 18 (10-26-23 @ 19:55) (18 - 18)  SpO2: 96% (10-26-23 @ 19:55) (96% - 96%)

## 2023-10-26 NOTE — BH INPATIENT PSYCHIATRY PROGRESS NOTE - CURRENT MEDICATION
MEDICATIONS  (STANDING):  clonazePAM  Tablet 0.5 milliGRAM(s) Oral at bedtime  escitalopram 10 milliGRAM(s) Oral daily  losartan 50 milliGRAM(s) Oral daily  mirtazapine 15 milliGRAM(s) Oral at bedtime  nicotine - 21 mG/24Hr(s) Patch 1 Patch Transdermal daily  NIFEdipine XL 30 milliGRAM(s) Oral daily  polyethylene glycol 3350 17 Gram(s) Oral daily  traZODone 50 milliGRAM(s) Oral at bedtime    MEDICATIONS  (PRN):  acetaminophen     Tablet .. 650 milliGRAM(s) Oral every 6 hours PRN Moderate Pain (4 - 6)  nicotine  Polacrilex Gum 2 milliGRAM(s) Oral every 2 hours PRN nicotine dependence without withdrawal  ondansetron    Tablet 4 milliGRAM(s) Oral once PRN nausea  traZODone 50 milliGRAM(s) Oral at bedtime PRN insomnia

## 2023-10-26 NOTE — BH INPATIENT PSYCHIATRY PROGRESS NOTE - NSBHMETABOLIC_PSY_ALL_CORE_FT
BMI: BMI (kg/m2): 18.3 (10-16-23 @ 14:29)  HbA1c: A1C with Estimated Average Glucose Result: 5.2 % (10-18-23 @ 14:50)    Glucose:   BP: 143/82 (10-26-23 @ 09:00) (128/81 - 152/91)  Lipid Panel: Date/Time: 10-18-23 @ 14:50  Cholesterol, Serum: 249  Direct LDL: --  HDL Cholesterol, Serum: 54  Total Cholesterol/HDL Ration Measurement: --  Triglycerides, Serum: 115   BMI: BMI (kg/m2): 18.3 (10-16-23 @ 14:29)  HbA1c: A1C with Estimated Average Glucose Result: 5.2 % (10-18-23 @ 14:50)    Glucose:   BP: 151/77 (10-26-23 @ 19:55) (128/81 - 154/81)  Lipid Panel: Date/Time: 10-18-23 @ 14:50  Cholesterol, Serum: 249  Direct LDL: --  HDL Cholesterol, Serum: 54  Total Cholesterol/HDL Ration Measurement: --  Triglycerides, Serum: 115

## 2023-10-26 NOTE — BH INPATIENT PSYCHIATRY PROGRESS NOTE - NSBHASSESSSUMMFT_PSY_ALL_CORE
This is a 77y/o  female, , retired, domiciled in apartment alone. No pertinent medical hx. Psychiatric hx of reported depression, anxiety, insomnia, currently in treatment with Dr. Stevens (remeron, klonopin, trazodone). No prior psychiatric hospitalizations, no hx of SA or NSSIB. No hx of drug/etoh abuse. Hx of trauma in childhood. No current legal issues. Patient presents to ED at recommendation of her psychiatrist after disclosing to him that she had overdosed on her medications the night before- a combination of remeron, klonopin and trazodone. Utox negative for all substances. Patient admitted to [address] [place] [address].    -Medicine recs re elevated bp are appreciated: Losartan 50mg qd, Nifedipine 30mg qd  -Lexapro 10mg, will continue to titrate as appropriate  -Klonopin 0.5mg qhs  -Trazodone 50mg qhs/ 50mg qhs prn insomnia  -Remeron 15mg qd   This is a 79y/o  female, , retired, domiciled in apartment alone. No pertinent medical hx. Psychiatric hx of reported depression, anxiety, insomnia, currently in treatment with Dr. Stevens (remeron, klonopin, trazodone). No prior psychiatric hospitalizations, no hx of SA or NSSIB. No hx of drug/etoh abuse. Hx of trauma in childhood. No current legal issues. Patient presents to ED at recommendation of her psychiatrist after disclosing to him that she had overdosed on her medications the night before- a combination of remeron, klonopin and trazodone. Utox negative for all substances. Patient admitted to [address] [place] [address].    -Medicine recs re elevated bp are appreciated: Losartan 50mg qd for HTN Nifedipine 30mg qd for HTN  -Lexapro 10mg, will continue to titrate as appropriate for anxiety and depression   -Klonopin 0.5mg qhs for anxiety   -Trazodone 50mg qhs/ 50mg qhs prn insomnia  -Remeron 15mg qd  for isomnia     ;;10/26: stable on curent regime somewhat anxious

## 2023-10-27 PROCEDURE — 99231 SBSQ HOSP IP/OBS SF/LOW 25: CPT

## 2023-10-27 RX ADMIN — Medication 50 MILLIGRAM(S): at 21:07

## 2023-10-27 RX ADMIN — Medication 650 MILLIGRAM(S): at 15:07

## 2023-10-27 RX ADMIN — ESCITALOPRAM OXALATE 10 MILLIGRAM(S): 10 TABLET, FILM COATED ORAL at 10:32

## 2023-10-27 RX ADMIN — Medication 0.5 MILLIGRAM(S): at 21:07

## 2023-10-27 RX ADMIN — Medication 30 MILLIGRAM(S): at 10:32

## 2023-10-27 RX ADMIN — MIRTAZAPINE 15 MILLIGRAM(S): 45 TABLET, ORALLY DISINTEGRATING ORAL at 21:07

## 2023-10-27 RX ADMIN — Medication 1 PATCH: at 11:14

## 2023-10-27 RX ADMIN — LOSARTAN POTASSIUM 50 MILLIGRAM(S): 100 TABLET, FILM COATED ORAL at 11:14

## 2023-10-27 RX ADMIN — Medication 650 MILLIGRAM(S): at 15:30

## 2023-10-27 NOTE — BH INPATIENT PSYCHIATRY PROGRESS NOTE - NSBHMETABOLIC_PSY_ALL_CORE_FT
BMI: BMI (kg/m2): 18.3 (10-16-23 @ 14:29)  HbA1c: A1C with Estimated Average Glucose Result: 5.2 % (10-18-23 @ 14:50)    Glucose:   BP: 129/76 (10-27-23 @ 08:28) (128/81 - 154/81)  Lipid Panel: Date/Time: 10-18-23 @ 14:50  Cholesterol, Serum: 249  Direct LDL: --  HDL Cholesterol, Serum: 54  Total Cholesterol/HDL Ration Measurement: --  Triglycerides, Serum: 115

## 2023-10-27 NOTE — BH INPATIENT PSYCHIATRY PROGRESS NOTE - NSBHASSESSSUMMFT_PSY_ALL_CORE
This is a 79y/o  female, , retired, domiciled in apartment alone. No pertinent medical hx. Psychiatric hx of reported depression, anxiety, insomnia, currently in treatment with Dr. Stevens (remeron, klonopin, trazodone). No prior psychiatric hospitalizations, no hx of SA or NSSIB. No hx of drug/etoh abuse. Hx of trauma in childhood. No current legal issues. Patient presents to ED at recommendation of her psychiatrist after disclosing to him that she had overdosed on her medications the night before- a combination of remeron, klonopin and trazodone. Utox negative for all substances. Patient admitted to [address] [place] [address].    10/27- mood sx improving, no SI, anticipate discharge next week, continue current plan which has been stabilizing, look to further taper BZD    -Lexapro 10mg PO daily  -Klonopin 0.5mg PO qhs  -Remeron 15mg PO qHS  -medicine following, nutritional supplement, moisturizer, BP meds and NRT

## 2023-10-27 NOTE — BH INPATIENT PSYCHIATRY PROGRESS NOTE - NSBHFUPINTERVALHXFT_PSY_A_CORE
Pt known to writer from [facility]. Pt assessed on [facility].  She was alert, attentive, groomed with attention to detail, with brighter affect.  She reports feeling significantly improved, denies any interim SI, feels relieved and supported that friends have stepped in to help with financial/housing stressors, is future oriented.  She feels that Rx regimen is effective and does not desire any changes.  Sleeping well, no uncontrolled anxiety, no akathisia, no evidence for psychosis.  BP controlled, asymptomatic.  Likes chocolate Boost supplement (only Ensure available per order review), friends bringing in.

## 2023-10-27 NOTE — BH INPATIENT PSYCHIATRY PROGRESS NOTE - NSBHCHARTREVIEWVS_PSY_A_CORE FT
Vital Signs Last 24 Hrs  T(C): 37 (10-27-23 @ 08:28), Max: 37.2 (10-26-23 @ 16:25)  T(F): 98.6 (10-27-23 @ 08:28), Max: 98.9 (10-26-23 @ 16:25)  HR: 80 (10-27-23 @ 08:28) (76 - 82)  BP: 129/76 (10-27-23 @ 08:28) (129/76 - 154/81)  BP(mean): --  RR: 18 (10-27-23 @ 08:28) (18 - 18)  SpO2: 96% (10-27-23 @ 08:28) (96% - 96%)

## 2023-10-27 NOTE — PROGRESS NOTE ADULT - SUBJECTIVE AND OBJECTIVE BOX
CC: Patient is a 78y old  Female who presents with a chief complaint of     INTERVAL EVENTS: JESSICA    SUBJECTIVE / INTERVAL HPI: Patient seen and examined at bedside. feeling well this AM     ROS: negative unless otherwise stated above.    VITAL SIGNS:  Vital Signs Last 24 Hrs  T(C): 37 (27 Oct 2023 08:28), Max: 37.2 (26 Oct 2023 16:25)  T(F): 98.6 (27 Oct 2023 08:28), Max: 98.9 (26 Oct 2023 16:25)  HR: 80 (27 Oct 2023 08:28) (76 - 82)  BP: 129/76 (27 Oct 2023 08:28) (129/76 - 154/81)  BP(mean): --  RR: 18 (27 Oct 2023 08:28) (18 - 18)  SpO2: 96% (27 Oct 2023 08:28) (96% - 96%)    Parameters below as of 27 Oct 2023 08:28  Patient On (Oxygen Delivery Method): room air            PHYSICAL EXAM:    General: NAD  HEENT: MMM  Extremities: WWP; no edema, clubbing or cyanosis  Vascular: 2+ radial, DP/PT pulses B/L  Neurological: AAOx3    MEDICATIONS:  MEDICATIONS  (STANDING):  clonazePAM  Tablet 0.5 milliGRAM(s) Oral at bedtime  escitalopram 10 milliGRAM(s) Oral daily  losartan 50 milliGRAM(s) Oral daily  mirtazapine 15 milliGRAM(s) Oral at bedtime  nicotine - 21 mG/24Hr(s) Patch 1 Patch Transdermal daily  NIFEdipine XL 30 milliGRAM(s) Oral daily  polyethylene glycol 3350 17 Gram(s) Oral daily  traZODone 50 milliGRAM(s) Oral at bedtime    MEDICATIONS  (PRN):  acetaminophen     Tablet .. 650 milliGRAM(s) Oral every 6 hours PRN Moderate Pain (4 - 6)  nicotine  Polacrilex Gum 2 milliGRAM(s) Oral every 2 hours PRN nicotine dependence without withdrawal  ondansetron    Tablet 4 milliGRAM(s) Oral once PRN nausea  traZODone 50 milliGRAM(s) Oral at bedtime PRN insomnia      ALLERGIES:  Allergies    penicillin (Hives)  tetracycline (Hives)    Intolerances        LABS:              CAPILLARY BLOOD GLUCOSE          RADIOLOGY & ADDITIONAL TESTS: Reviewed.

## 2023-10-27 NOTE — PROGRESS NOTE ADULT - ASSESSMENT
77 y/o f with PMH of depression presents to ED with suicidal attempt by overdose. Medicine consulted for hypertension.     #Rash  1x 3mm petechiae on L dorsal hand noticed today. Patient wears long sleeve shirt with trousers with only b/l hands and face exposed. Unclear if from insect bite.  - continue to monitor skin    #HTN  BP improved.  - c/w Losartan 50mg & Nidefipine ER 30mg Qd  - monitor BP    #Smoking   - started nicotine patch 21mg/d for one week ( 10/20-) the taper 14mg/d for one week then 7gm/d for one week   - smoking cessation advised     #Weight Loss  patient states has had decreased appetite 2/2 stress. has lost weight over the past few months  - started Ensure chocolate flavor 2 bottles a day  -  RN ok for friend to bring in Boost chocolate flavor and they can be kept in the [employer] fridge    Med consult will continue to follow.

## 2023-10-28 PROCEDURE — 99232 SBSQ HOSP IP/OBS MODERATE 35: CPT

## 2023-10-28 RX ORDER — POLYETHYLENE GLYCOL 3350 17 G/17G
17 POWDER, FOR SOLUTION ORAL AT BEDTIME
Refills: 0 | Status: DISCONTINUED | OUTPATIENT
Start: 2023-10-28 | End: 2023-11-01

## 2023-10-28 RX ADMIN — MIRTAZAPINE 15 MILLIGRAM(S): 45 TABLET, ORALLY DISINTEGRATING ORAL at 21:03

## 2023-10-28 RX ADMIN — Medication 50 MILLIGRAM(S): at 21:01

## 2023-10-28 RX ADMIN — Medication 1 PATCH: at 07:28

## 2023-10-28 RX ADMIN — Medication 0.5 MILLIGRAM(S): at 21:01

## 2023-10-28 RX ADMIN — Medication 30 MILLIGRAM(S): at 10:02

## 2023-10-28 RX ADMIN — LOSARTAN POTASSIUM 50 MILLIGRAM(S): 100 TABLET, FILM COATED ORAL at 10:02

## 2023-10-28 RX ADMIN — ESCITALOPRAM OXALATE 10 MILLIGRAM(S): 10 TABLET, FILM COATED ORAL at 10:02

## 2023-10-28 RX ADMIN — Medication 1 PATCH: at 10:05

## 2023-10-28 RX ADMIN — POLYETHYLENE GLYCOL 3350 17 GRAM(S): 17 POWDER, FOR SOLUTION ORAL at 21:01

## 2023-10-28 NOTE — PROGRESS NOTE ADULT - SUBJECTIVE AND OBJECTIVE BOX
CC: Patient is a 78y old  Female who presents with a chief complaint of     INTERVAL EVENTS: JESSICA    SUBJECTIVE / INTERVAL HPI: Patient seen and examined at bedside.     ROS: negative unless otherwise stated above.    VITAL SIGNS:  Vital Signs Last 24 Hrs  T(C): 37.2 (27 Oct 2023 16:09), Max: 37.2 (27 Oct 2023 16:09)  T(F): 98.9 (27 Oct 2023 16:09), Max: 98.9 (27 Oct 2023 16:09)  HR: 78 (27 Oct 2023 16:09) (78 - 80)  BP: 139/76 (27 Oct 2023 16:09) (129/76 - 139/76)  BP(mean): --  RR: 18 (27 Oct 2023 16:09) (18 - 18)  SpO2: 96% (27 Oct 2023 16:09) (96% - 96%)    Parameters below as of 27 Oct 2023 16:09  Patient On (Oxygen Delivery Method): room air            PHYSICAL EXAM:    General: NAD  HEENT: MMM  Neck: supple  Cardiovascular: +S1/S2; RRR  Respiratory: CTA B/L; no W/R/R  Gastrointestinal: soft, NT/ND  Extremities: WWP; no edema, clubbing or cyanosis  Vascular: 2+ radial, DP/PT pulses B/L  Neurological: AAOx3; no focal deficits    MEDICATIONS:  MEDICATIONS  (STANDING):  clonazePAM  Tablet 0.5 milliGRAM(s) Oral at bedtime  escitalopram 10 milliGRAM(s) Oral daily  losartan 50 milliGRAM(s) Oral daily  mirtazapine 15 milliGRAM(s) Oral at bedtime  nicotine - 21 mG/24Hr(s) Patch 1 Patch Transdermal daily  NIFEdipine XL 30 milliGRAM(s) Oral daily  polyethylene glycol 3350 17 Gram(s) Oral daily  traZODone 50 milliGRAM(s) Oral at bedtime    MEDICATIONS  (PRN):  acetaminophen     Tablet .. 650 milliGRAM(s) Oral every 6 hours PRN Moderate Pain (4 - 6)  nicotine  Polacrilex Gum 2 milliGRAM(s) Oral every 2 hours PRN nicotine dependence without withdrawal  ondansetron    Tablet 4 milliGRAM(s) Oral once PRN nausea  traZODone 50 milliGRAM(s) Oral at bedtime PRN insomnia      ALLERGIES:  Allergies    penicillin (Hives)  tetracycline (Hives)    Intolerances        LABS:              CAPILLARY BLOOD GLUCOSE          RADIOLOGY & ADDITIONAL TESTS: Reviewed. CC: Patient is a 78y old  Female who presents with a chief complaint of     INTERVAL EVENTS: JESSICA    SUBJECTIVE / INTERVAL HPI: Patient seen and examined at bedside.     ROS: negative unless otherwise stated above.    VITAL SIGNS:  Vital Signs Last 24 Hrs  T(C): 37.2 (27 Oct 2023 16:09), Max: 37.2 (27 Oct 2023 16:09)  T(F): 98.9 (27 Oct 2023 16:09), Max: 98.9 (27 Oct 2023 16:09)  HR: 78 (27 Oct 2023 16:09) (78 - 80)  BP: 139/76 (27 Oct 2023 16:09) (129/76 - 139/76)  BP(mean): --  RR: 18 (27 Oct 2023 16:09) (18 - 18)  SpO2: 96% (27 Oct 2023 16:09) (96% - 96%)    Parameters below as of 27 Oct 2023 16:09  Patient On (Oxygen Delivery Method): room air            PHYSICAL EXAM:    General: NAD  HEENT: MMM  Extremities: WWP; no edema, clubbing or cyanosis  Vascular: 2+ radial, DP/PT pulses B/L  Neurological: AAOx3    MEDICATIONS:  MEDICATIONS  (STANDING):  clonazePAM  Tablet 0.5 milliGRAM(s) Oral at bedtime  escitalopram 10 milliGRAM(s) Oral daily  losartan 50 milliGRAM(s) Oral daily  mirtazapine 15 milliGRAM(s) Oral at bedtime  nicotine - 21 mG/24Hr(s) Patch 1 Patch Transdermal daily  NIFEdipine XL 30 milliGRAM(s) Oral daily  polyethylene glycol 3350 17 Gram(s) Oral daily  traZODone 50 milliGRAM(s) Oral at bedtime    MEDICATIONS  (PRN):  acetaminophen     Tablet .. 650 milliGRAM(s) Oral every 6 hours PRN Moderate Pain (4 - 6)  nicotine  Polacrilex Gum 2 milliGRAM(s) Oral every 2 hours PRN nicotine dependence without withdrawal  ondansetron    Tablet 4 milliGRAM(s) Oral once PRN nausea  traZODone 50 milliGRAM(s) Oral at bedtime PRN insomnia      ALLERGIES:  Allergies    penicillin (Hives)  tetracycline (Hives)    Intolerances        LABS:              CAPILLARY BLOOD GLUCOSE          RADIOLOGY & ADDITIONAL TESTS: Reviewed.

## 2023-10-28 NOTE — BH SAFETY PLAN - WARNING SIGN 1
Pt. completed a written safety plan and was provided a copy; additional copies can be found in pt.'s chart.

## 2023-10-29 PROCEDURE — 99232 SBSQ HOSP IP/OBS MODERATE 35: CPT

## 2023-10-29 RX ADMIN — LOSARTAN POTASSIUM 50 MILLIGRAM(S): 100 TABLET, FILM COATED ORAL at 10:47

## 2023-10-29 RX ADMIN — Medication 50 MILLIGRAM(S): at 21:10

## 2023-10-29 RX ADMIN — Medication 30 MILLIGRAM(S): at 10:46

## 2023-10-29 RX ADMIN — ESCITALOPRAM OXALATE 10 MILLIGRAM(S): 10 TABLET, FILM COATED ORAL at 10:47

## 2023-10-29 RX ADMIN — Medication 1 PATCH: at 10:46

## 2023-10-29 RX ADMIN — Medication 0.5 MILLIGRAM(S): at 21:10

## 2023-10-29 RX ADMIN — Medication 1 PATCH: at 06:44

## 2023-10-29 NOTE — PROGRESS NOTE ADULT - SUBJECTIVE AND OBJECTIVE BOX
MERCED ALDANA , [id number],  [facility] [id number]    Time of encounter :  10 am   ambulating, watching TV  she made a note of the blood pressure, asymptomatic     T(C): 36.7 (10-29-23 @ 08:11), Max: 37.3 (10-28-23 @ 17:23)  HR: 73 (10-29-23 @ 08:11) (67 - 73)  BP: 144/85 (10-29-23 @ 08:11) (144/85 - 144/86)  RR: 18 (10-28-23 @ 17:23) (18 - 18)  SpO2: 97% (10-29-23 @ 08:11) (96% - 97%)    acetaminophen     Tablet .. 650 milliGRAM(s) Oral every 6 hours PRN  clonazePAM  Tablet 0.5 milliGRAM(s) Oral at bedtime  escitalopram 10 milliGRAM(s) Oral daily  losartan 50 milliGRAM(s) Oral daily  mirtazapine 15 milliGRAM(s) Oral at bedtime  nicotine  Polacrilex Gum 2 milliGRAM(s) Oral every 2 hours PRN  nicotine - 21 mG/24Hr(s) Patch 1 Patch Transdermal daily  NIFEdipine XL 30 milliGRAM(s) Oral daily  ondansetron    Tablet 4 milliGRAM(s) Oral once PRN  polyethylene glycol 3350 17 Gram(s) Oral at bedtime  traZODone 50 milliGRAM(s) Oral at bedtime PRN  traZODone 50 milliGRAM(s) Oral at bedtime      Physical Exam :    General exam :appear well, ambulating, no sign of edema.                Daily     Daily   CAPILLARY BLOOD GLUCOSE

## 2023-10-29 NOTE — PROGRESS NOTE ADULT - ASSESSMENT
79 y/o f with PMH of depression presents to ED with suicidal attempt by overdose. Medicine consulted for hypertension.     CC: Pt seen w. . ambulatory,  2 small red spot on left hand ( no itchiness- same as the day before.   ambulating.    #HTN  - bp accepatble.- would monitor BP -SBP in low 140s with DBP in 80s , BP control is better achieved in out-patient setting.   - c/w Losartan 50mg po daily   - c/w Nidefipine ER 30mg po bid   - monitor BP    # smoking   - started nicotine patch 21mg/d for one week ( 10/20-) the taper 14mg/d for one week then 7gm/d for one week   - smoking cessation advised     #Weight Loss  patient states has had decreased appetite 2/2 stress. has lost weight over the past few months  - started Ensure chocolate flavor 2 bottles a day    dry skin -encouraged -moisturizer.    Med consult will continue to follow. waqas  team.  follow BP, no adjustment today.   79 y/o f with PMH of depression presents to ED with suicidal attempt by overdose. Medicine consulted for hypertension.     CC: Pt seen w. . ambulatory,  2 small red spot on left hand ( no itchiness- same as the day before.   ambulating.    #HTN  - bp accepatble.- would monitor BP -SBP in low 140s with DBP in 80s , BP control is better achieved in out-patient setting.   - c/w Losartan 50mg po daily   - c/w Nidefipine ER 30mg po getting daily  - monitor BP    # smoking   - started nicotine patch 21mg/d for one week ( 10/20-) the taper 14mg/d for one week then 7gm/d for one week   - smoking cessation advised     #Weight Loss  patient states has had decreased appetite 2/2 stress. has lost weight over the past few months  - started Ensure chocolate flavor 2 bottles a day    dry skin -encouraged -moisturizer.    Med consult will continue to follow. waqas  team.  follow BP, no adjustment today.

## 2023-10-30 PROCEDURE — 99232 SBSQ HOSP IP/OBS MODERATE 35: CPT

## 2023-10-30 RX ORDER — LOSARTAN POTASSIUM 100 MG/1
1 TABLET, FILM COATED ORAL
Qty: 0 | Refills: 0 | DISCHARGE
Start: 2023-10-30

## 2023-10-30 RX ORDER — CLONAZEPAM 1 MG
0.5 TABLET ORAL AT BEDTIME
Refills: 0 | Status: DISCONTINUED | OUTPATIENT
Start: 2023-10-30 | End: 2023-11-01

## 2023-10-30 RX ORDER — CLONAZEPAM 1 MG
1 TABLET ORAL
Qty: 0 | Refills: 0 | DISCHARGE
Start: 2023-10-30

## 2023-10-30 RX ORDER — TOBRAMYCIN 0.3 %
1 DROPS OPHTHALMIC (EYE) THREE TIMES A DAY
Refills: 0 | Status: DISCONTINUED | OUTPATIENT
Start: 2023-10-30 | End: 2023-11-01

## 2023-10-30 RX ORDER — TRAZODONE HCL 50 MG
1 TABLET ORAL
Qty: 0 | Refills: 0 | DISCHARGE
Start: 2023-10-30

## 2023-10-30 RX ORDER — NIFEDIPINE 30 MG
1 TABLET, EXTENDED RELEASE 24 HR ORAL
Qty: 0 | Refills: 0 | DISCHARGE
Start: 2023-10-30

## 2023-10-30 RX ORDER — ESCITALOPRAM OXALATE 10 MG/1
1 TABLET, FILM COATED ORAL
Qty: 0 | Refills: 0 | DISCHARGE
Start: 2023-10-30

## 2023-10-30 RX ORDER — TOBRAMYCIN 0.3 %
1 DROPS OPHTHALMIC (EYE) DAILY
Refills: 0 | Status: DISCONTINUED | OUTPATIENT
Start: 2023-10-30 | End: 2023-10-30

## 2023-10-30 RX ORDER — TRAZODONE HCL 50 MG
100 TABLET ORAL AT BEDTIME
Refills: 0 | Status: DISCONTINUED | OUTPATIENT
Start: 2023-10-30 | End: 2023-11-01

## 2023-10-30 RX ORDER — MIRTAZAPINE 45 MG/1
1 TABLET, ORALLY DISINTEGRATING ORAL
Qty: 0 | Refills: 0 | DISCHARGE
Start: 2023-10-30

## 2023-10-30 RX ORDER — TOBRAMYCIN 0.3 %
1 DROPS OPHTHALMIC (EYE)
Qty: 0 | Refills: 0 | DISCHARGE
Start: 2023-10-30

## 2023-10-30 RX ADMIN — Medication 30 MILLIGRAM(S): at 10:34

## 2023-10-30 RX ADMIN — Medication 1 PATCH: at 19:32

## 2023-10-30 RX ADMIN — Medication 1 PATCH: at 10:35

## 2023-10-30 RX ADMIN — MIRTAZAPINE 15 MILLIGRAM(S): 45 TABLET, ORALLY DISINTEGRATING ORAL at 01:00

## 2023-10-30 RX ADMIN — Medication 1 PATCH: at 12:23

## 2023-10-30 RX ADMIN — Medication 0.5 MILLIGRAM(S): at 21:04

## 2023-10-30 RX ADMIN — LOSARTAN POTASSIUM 50 MILLIGRAM(S): 100 TABLET, FILM COATED ORAL at 10:35

## 2023-10-30 RX ADMIN — ESCITALOPRAM OXALATE 10 MILLIGRAM(S): 10 TABLET, FILM COATED ORAL at 10:35

## 2023-10-30 RX ADMIN — MIRTAZAPINE 15 MILLIGRAM(S): 45 TABLET, ORALLY DISINTEGRATING ORAL at 21:04

## 2023-10-30 RX ADMIN — Medication 100 MILLIGRAM(S): at 21:04

## 2023-10-30 RX ADMIN — Medication 1 DROP(S): at 21:04

## 2023-10-30 NOTE — BH INPATIENT PSYCHIATRY DISCHARGE NOTE - NSDCCPCAREPLAN_GEN_ALL_CORE_FT
PRINCIPAL DISCHARGE DIAGNOSIS  Diagnosis: Severe episode of recurrent major depressive disorder, without psychotic features  Assessment and Plan of Treatment: Continue current medication regimen and follow up with outpatient psychiatrist      SECONDARY DISCHARGE DIAGNOSES  Diagnosis: HTN (hypertension)  Assessment and Plan of Treatment: Continue current medication regimen and follow up with outpatient psychiatrist

## 2023-10-30 NOTE — BH INPATIENT PSYCHIATRY PROGRESS NOTE - NSBHMETABOLIC_PSY_ALL_CORE_FT
BMI: BMI (kg/m2): 18.3 (10-16-23 @ 14:29)  HbA1c: A1C with Estimated Average Glucose Result: 5.2 % (10-18-23 @ 14:50)    Glucose:   BP: 131/82 (10-30-23 @ 08:40) (131/82 - 147/78)  Lipid Panel: Date/Time: 10-18-23 @ 14:50  Cholesterol, Serum: 249  Direct LDL: --  HDL Cholesterol, Serum: 54  Total Cholesterol/HDL Ration Measurement: --  Triglycerides, Serum: 115

## 2023-10-30 NOTE — BH TREATMENT PLAN - NSTXSUICIDINTERRN_PSY_ALL_CORE
Assess and monitor potential for self-harm including ideation, intention, plan and lethality.   Initiate suicide-risk precautions; provide a safe environment; remove all potentially dangerous items; monitor all activities carefully.
Assess and monitor potential for self-harm including ideation, intention, plan and lethality.   Initiate suicide-risk precautions; provide a safe environment; remove all potentially dangerous items; monitor all activities carefully.

## 2023-10-30 NOTE — BH INPATIENT PSYCHIATRY DISCHARGE NOTE - ATTENDING DISCHARGE PHYSICAL EXAMINATION:
;;11/01: Not endorsing  suicidal or homicidal ideation intent or plans; no mention of auditory or visual hallucinations Alert; oriented; cognition intact; speech clear; no tremor or evidence of movement impairment. Future oriented; looks forward to taking a shower.  Cheerful ; future oriented; i&j fair  : aware of medications and acknowledges symptoms somewhat  reflective in a meaningful way  on issues that impact on symptoms.

## 2023-10-30 NOTE — BH INPATIENT PSYCHIATRY PROGRESS NOTE - NSBHASSESSSUMMFT_PSY_ALL_CORE
This is a 77y/o  female, , retired, domiciled in apartment alone. No pertinent medical hx. Psychiatric hx of reported depression, anxiety, insomnia, currently in treatment with Dr. Stevens (remeron, klonopin, trazodone). No prior psychiatric hospitalizations, no hx of SA or NSSIB. No hx of drug/etoh abuse. Hx of trauma in childhood. No current legal issues. Patient presents to ED at recommendation of her psychiatrist after disclosing to him that she had overdosed on her medications the night before- a combination of remeron, klonopin and trazodone. Utox negative for all substances. Patient admitted to [facility] 2p.    -Lexapro 10mg PO daily  -Klonopin 0.5mg PO qhs  -Remeron 15mg PO qHS  -medicine following, nutritional supplement, moisturizer, BP meds and NRT

## 2023-10-30 NOTE — BH TREATMENT PLAN - NSTXDEPRESINTERPR_PSY_ALL_CORE
Pt. will continue to be invited to all offered groups
Pt will continue to be invited and encouraged to attend all offered groups

## 2023-10-30 NOTE — BH INPATIENT PSYCHIATRY DISCHARGE NOTE - NSBHFUPINTERVALHXFT_PSY_A_CORE
Patient visible on the unit, observed socializing with peers and staff, attending groups regularly. Patient seen by writer and NP Lorin Mckya. Patient calm, cooperative, with good eye contact. Patient described mood as bright, anticipating the upcoming discharge. Patient is future oriented, discussing the SPOP program and her plans to talk to her psychologist once she is discharged. Patient states this unit has really helped her, it has shown her how much she loves socializing with people and brought that aspect of herself back. Patient states she is excited to meet new people and continue to enjoy the social aspect of her life once she is discharged. Patient states she is thankful for the help she has received here and can see the difference in herself from when she arrived to where she is now. Patient endorses good sleep and appetite. She denies feeling of depression, SI, HI, auditory/visual hallucinations. Per staff report, patient has had a bright mood, attending groups, and is discharge focused.

## 2023-10-30 NOTE — BH INPATIENT PSYCHIATRY PROGRESS NOTE - NSBHCHARTREVIEWVS_PSY_A_CORE FT
Vital Signs Last 24 Hrs  T(C): 37.3 (10-30-23 @ 08:40), Max: 37.3 (10-30-23 @ 08:40)  T(F): 99.2 (10-30-23 @ 08:40), Max: 99.2 (10-30-23 @ 08:40)  HR: 77 (10-30-23 @ 08:40) (74 - 77)  BP: 131/82 (10-30-23 @ 08:40) (131/82 - 146/82)  BP(mean): --  RR: 17 (10-29-23 @ 16:54) (17 - 17)  SpO2: 95% (10-30-23 @ 08:40) (95% - 97%)

## 2023-10-30 NOTE — PROGRESS NOTE ADULT - SUBJECTIVE AND OBJECTIVE BOX
CC: Patient is a 78y old  Female who presents with a chief complaint of     INTERVAL EVENTS: JESSICA    SUBJECTIVE / INTERVAL HPI: Patient seen and examined at bedside.     ROS: negative unless otherwise stated above.    VITAL SIGNS:  Vital Signs Last 24 Hrs  T(C): 37.3 (30 Oct 2023 08:40), Max: 37.3 (30 Oct 2023 08:40)  T(F): 99.2 (30 Oct 2023 08:40), Max: 99.2 (30 Oct 2023 08:40)  HR: 77 (30 Oct 2023 08:40) (74 - 77)  BP: 131/82 (30 Oct 2023 08:40) (131/82 - 146/82)  BP(mean): --  RR: 17 (29 Oct 2023 16:54) (17 - 17)  SpO2: 95% (30 Oct 2023 08:40) (95% - 97%)    Parameters below as of 30 Oct 2023 08:40  Patient On (Oxygen Delivery Method): room air            PHYSICAL EXAM:    General: NAD  HEENT: MMM  Neck: supple  Cardiovascular: +S1/S2; RRR  Respiratory: CTA B/L; no W/R/R  Gastrointestinal: soft, NT/ND  Extremities: WWP; no edema, clubbing or cyanosis  Vascular: 2+ radial, DP/PT pulses B/L  Neurological: AAOx3; no focal deficits    MEDICATIONS:  MEDICATIONS  (STANDING):  clonazePAM  Tablet 0.5 milliGRAM(s) Oral at bedtime  escitalopram 10 milliGRAM(s) Oral daily  losartan 50 milliGRAM(s) Oral daily  mirtazapine 15 milliGRAM(s) Oral at bedtime  nicotine - 21 mG/24Hr(s) Patch 1 Patch Transdermal daily  NIFEdipine XL 30 milliGRAM(s) Oral daily  polyethylene glycol 3350 17 Gram(s) Oral at bedtime  traZODone 50 milliGRAM(s) Oral at bedtime    MEDICATIONS  (PRN):  acetaminophen     Tablet .. 650 milliGRAM(s) Oral every 6 hours PRN Moderate Pain (4 - 6)  nicotine  Polacrilex Gum 2 milliGRAM(s) Oral every 2 hours PRN nicotine dependence without withdrawal  ondansetron    Tablet 4 milliGRAM(s) Oral once PRN nausea  traZODone 50 milliGRAM(s) Oral at bedtime PRN insomnia      ALLERGIES:  Allergies    penicillin (Hives)  tetracycline (Hives)    Intolerances        LABS:              CAPILLARY BLOOD GLUCOSE          RADIOLOGY & ADDITIONAL TESTS: Reviewed. CC: Patient is a 78y old  Female who presents with a chief complaint of     INTERVAL EVENTS: JESSICA    SUBJECTIVE / INTERVAL HPI: Patient seen and examined at bedside. feeling well this AM     ROS: negative unless otherwise stated above.    VITAL SIGNS:  Vital Signs Last 24 Hrs  T(C): 37.3 (30 Oct 2023 08:40), Max: 37.3 (30 Oct 2023 08:40)  T(F): 99.2 (30 Oct 2023 08:40), Max: 99.2 (30 Oct 2023 08:40)  HR: 77 (30 Oct 2023 08:40) (74 - 77)  BP: 131/82 (30 Oct 2023 08:40) (131/82 - 146/82)  BP(mean): --  RR: 17 (29 Oct 2023 16:54) (17 - 17)  SpO2: 95% (30 Oct 2023 08:40) (95% - 97%)    Parameters below as of 30 Oct 2023 08:40  Patient On (Oxygen Delivery Method): room air            PHYSICAL EXAM:    General: NAD  Cardiovascular: +S1/S2; RRR  Respiratory: CTA B/L; no W/R/R  Vascular: 2+ radial, DP/PT pulses B/L  Neurological: AAOx3    MEDICATIONS:  MEDICATIONS  (STANDING):  clonazePAM  Tablet 0.5 milliGRAM(s) Oral at bedtime  escitalopram 10 milliGRAM(s) Oral daily  losartan 50 milliGRAM(s) Oral daily  mirtazapine 15 milliGRAM(s) Oral at bedtime  nicotine - 21 mG/24Hr(s) Patch 1 Patch Transdermal daily  NIFEdipine XL 30 milliGRAM(s) Oral daily  polyethylene glycol 3350 17 Gram(s) Oral at bedtime  traZODone 50 milliGRAM(s) Oral at bedtime    MEDICATIONS  (PRN):  acetaminophen     Tablet .. 650 milliGRAM(s) Oral every 6 hours PRN Moderate Pain (4 - 6)  nicotine  Polacrilex Gum 2 milliGRAM(s) Oral every 2 hours PRN nicotine dependence without withdrawal  ondansetron    Tablet 4 milliGRAM(s) Oral once PRN nausea  traZODone 50 milliGRAM(s) Oral at bedtime PRN insomnia      ALLERGIES:  Allergies    penicillin (Hives)  tetracycline (Hives)    Intolerances        LABS:              CAPILLARY BLOOD GLUCOSE          RADIOLOGY & ADDITIONAL TESTS: Reviewed.

## 2023-10-30 NOTE — BH INPATIENT PSYCHIATRY DISCHARGE NOTE - NSDCMRMEDTOKEN_GEN_ALL_CORE_FT
clonazePAM 0.5 mg oral tablet: 1 tab(s) orally once a day (at bedtime)  escitalopram 10 mg oral tablet: 1 tab(s) orally once a day  losartan 50 mg oral tablet: 1 tab(s) orally once a day  mirtazapine 15 mg oral tablet: 1 tab(s) orally once a day (at bedtime)  NIFEdipine 30 mg oral tablet, extended release: 1 tab(s) orally once a day  tobramycin 0.3% ophthalmic ointment: 1 application to each affected eye once a day  traZODone 100 mg oral tablet: 1 tab(s) orally once a day (at bedtime)   clonazePAM 0.5 mg oral tablet: 1 tab(s) orally once a day (at bedtime)  escitalopram 10 mg oral tablet: 1 tab(s) orally once a day  losartan 50 mg oral tablet: 1 tab(s) orally once a day  mirtazapine 15 mg oral tablet: 1 tab(s) orally once a day (at bedtime)  nicotine 21 mg/24 hr transdermal film, extended release: 1 patch transdermal once a day as needed for NRT  NIFEdipine 30 mg oral tablet, extended release: 1 tab(s) orally once a day  tobramycin 0.3% ophthalmic solution: 1 drop(s) to each affected eye 3 times a day  traZODone 100 mg oral tablet: 1 tab(s) orally once a day (at bedtime)

## 2023-10-30 NOTE — BH INPATIENT PSYCHIATRY DISCHARGE NOTE - REASON FOR ADMISSION
Patient presents to ED at recommendation of her psychiatrist after disclosing to him that she had overdosed on her medications the night before- a combination of remeron, klonopin and trazodone. Utox negative for all substances. Patient admitted to [address] [place] [address]

## 2023-10-30 NOTE — BH INPATIENT PSYCHIATRY DISCHARGE NOTE - HPI (INCLUDE ILLNESS QUALITY, SEVERITY, DURATION, TIMING, CONTEXT, MODIFYING FACTORS, ASSOCIATED SIGNS AND SYMPTOMS)
This is a 77y/o  female, , retired, domiciled in apartment alone. No pertinent medical hx. Psychiatric hx of reported depression, anxiety, insomnia, currently in treatment with Dr. Stevens (remeron, klonopin, trazodone). No prior psychiatric hospitalizations, no hx of SA or NSSIB. No hx of drug/etoh abuse. Hx of trauma in childhood. No current legal issues. Patient presents to ED at recommendation of her psychiatrist after disclosing to him that she had overdosed on her medications the night before- a combination of remeron, klonopin and trazodone. Utox negative for all substances. Patient admitted to [address] [place] [address].    Patient states that she has had a good life, is pushing 80 years old and running out of money. She realized that some time ago she was running out of money and would no longer be able to pay for her condo. She decided to sell it. However it required renovations which started in August. She has been feeling overwhelmed with the renovations underway, the various workmen in the space and dust in the home and also having to ask a friend for a $10k loan. Also she had been going through mementos of her life during this time and has been feeling depressed. She had been having thoughts of suicide for the last 2 months and has been 'getting up the courage' to do it. She did not shares this with anyone, and had never attempted suicide before in her life. She told the workmen not to come in for the next 5 days and states 'my intention was to take all these pills, go to sleep and not wake up.' She anticipated that either her super or  would find her. However she was unable to keep the pills down. The next day she woke up and texted her psychiatrist who told her to go to the hospital. She had a friend meet her outside the ED before coming inside. She states that once she realized that she would not be able to keep her standard of living once selling her condo as well as feeling 'very ashamed' about her circumstances in addition to being concerned about appearances she thought more of suicide.    She shares that she has suffered from depression since age 21, and has had therapy on and off since then. She had been treated with trazodone and klonopin for years until April when remeron was added to regimen as she was losing a lot of weight. She states that since deciding to sell her condo her anxiety has increased and subsequently her appetite decreased and in an effort to save money she hasn't been going out to eat much. Tobacco use has also increased from 1/2ppd to 1ppd. Sleep has been much better since addition of remeron. Denies any other drug/etoh use. 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    When asked about feelings regarding recent failed attempt she states 'I feel disappointed.' She also acknowledges that overdose is the ideal attempt for her and other ideas such as jumping out of a building or cutting her wrists would be too dramatic. 'I really wouldn't mind turning out the lights and going to sleep,'     She states that since admission she has spoken to several friends who will help her moving forward with getting a new apartment and financially. She is agreeable to initiation of an antidepressant as she acknowledges feeling depressed. No psychotic sxs or bipolar sxs elicited.       Per ED report:   79 yo F, no PMHx, hx depression treated with remeron, trazodone, and klonopin, in treatment with Dr Stevens outpt, no hx SA, arrived with friend s/p suicide attempt last night by overdose.  d/w ER Attg, pt medically cleared, reporting that she wishes she had succeeded last night.  Pt assessed with PA destiney An.  Pt reports being depressed for a long time, lost 20lbs since April, has had financial difficulty and is selling co-op on [address]  Pt with no children or living parents, describes isolation, has been contemplating suicide for months, then finally last night took handfully of klonopin 0.5mg, trazodone 100mg, remeron 15mg, approx #40 tabs in all, with intent to end her life.  Pt told no one of her suicide plans.  Pt started to vomit, then took additional doses of each medication to go to sleep, again with hope of not waking up.  This morning, pt felt shaky, texted psychiatrist and friend Amrita.  Psychiatrist told her to come to the ER, pt did not want to but came with friend.  Pt still wishes she had succeeded, "I've had a good life".  Pt reports being on klonopin 0.5mg PO qHS and trazodone 100mg PO qHS for many years, and over the spring remeron was added daniella to stimulate appetite.  Pt still feels suicidal, feels less likely to reattempt in the hospital but hesitant to answer.    PPHx: no prior SA, admissions, NSSI.  Has been with psychiatrist since .    Shx:  20yrs,  x2, used to run successful businsses, now retired.  Pt lives alone.  No children.      Substance: pt smokes 1ppd, 1 drink alcohol a few times per year, no other substances.  No hx addiction.    Fhx: mother and father had depression, mother's aunt  by suicide.    Collateral friend Kaylin Lambert [phone], present in ER: has known pt over 40yrs, no hx SA or admission, pt has made offhand comment about suicide that seemed to be a non-literal expression, no serious talk of ending her life.  Pt has been isolative, cancelling on plans, lonely, daniella sad about selling co-op and going through this process alone, financial stressors leading to this, is very depressed about this.    Psychiatrist Dr Stevens [phone]- student left message

## 2023-10-30 NOTE — BH TREATMENT PLAN - NSTXDEPRESINTERRN_PSY_ALL_CORE
patient  encouraged to cpmply with meds and treatment ,verbalize feelings to staff.
Convey caring approach, empathy and potential for change; hope is key for recovery.   Utilize existing coping strategies and assist in developing new strategies, such as relaxation, gratitude, music and problem-solving; assess for ineffective strategies (e.g., substance use, isolation).

## 2023-10-30 NOTE — BH TREATMENT PLAN - NSTXDCFAMINTERSW_PSY_ALL_CORE
Liaise with family and collateral providers weekly for 1 hour or as needed for discharge planning purposes.
Liaise with family and collateral providers weekly for 1 hour or as needed for discharge planning purposes.

## 2023-10-30 NOTE — BH INPATIENT PSYCHIATRY PROGRESS NOTE - CURRENT MEDICATION
MEDICATIONS  (STANDING):  clonazePAM  Tablet 0.5 milliGRAM(s) Oral at bedtime  escitalopram 10 milliGRAM(s) Oral daily  losartan 50 milliGRAM(s) Oral daily  mirtazapine 15 milliGRAM(s) Oral at bedtime  nicotine - 21 mG/24Hr(s) Patch 1 Patch Transdermal daily  NIFEdipine XL 30 milliGRAM(s) Oral daily  polyethylene glycol 3350 17 Gram(s) Oral at bedtime  tobramycin 0.3% Ointment 1 Application(s) Left EYE daily  traZODone 50 milliGRAM(s) Oral at bedtime    MEDICATIONS  (PRN):  acetaminophen     Tablet .. 650 milliGRAM(s) Oral every 6 hours PRN Moderate Pain (4 - 6)  nicotine  Polacrilex Gum 2 milliGRAM(s) Oral every 2 hours PRN nicotine dependence without withdrawal  ondansetron    Tablet 4 milliGRAM(s) Oral once PRN nausea  traZODone 50 milliGRAM(s) Oral at bedtime PRN insomnia

## 2023-10-30 NOTE — BH INPATIENT PSYCHIATRY PROGRESS NOTE - NSBHFUPINTERVALHXFT_PSY_A_CORE
Patient visible on the unit, observed holding a warm pack to her eye this morning. Eyelid is noted to be swollen. She reports having a hx of blepharitis with successful treatment of tobramycin. She requests for this to be restarted. No other medical concerns or issues reported. Mood is bright, she endorses feeling of hopefulness and desire to be discharged. She denies feeling of depression or anxiety. Sleep has been stable. She is grateful to treatment team crediting her improvement to meds and interactions. Future oriented and motivated for outpatient treatment. She shows writer the brochure to [facility] and information given to her by her SW [facility].  Aware of plan for discharge on Wednesday and is in agreement.

## 2023-10-30 NOTE — BH TREATMENT PLAN - NSTXDEPRESINTERSW_PSY_ALL_CORE
Convey caring approach, empathy and potential for change; hope is key for recovery.   Utilize existing coping strategies and assist in developing new strategies, such as relaxation, gratitude, music and problem-solving; assess for ineffective strategies (e.g., substance use, isolation).

## 2023-10-30 NOTE — PROGRESS NOTE ADULT - ASSESSMENT
79 y/o f with PMH of depression presents to ED with suicidal attempt by overdose. Medicine consulted for hypertension.     #HTN  BP improved.  - c/w Losartan 50mg & Nidefipine ER 30mg Qd  - monitor BP    #Left eye blepharitis  pt with chronic blepharitis for which she gets tobra eye drops 4 times a year.   - tobramycin opthalmic solution, 1 drop to left eye every 4 hours until symptoms resolve      #Smoking   - started nicotine patch 21mg/d for one week ( 10/20-) the taper 14mg/d for one week then 7gm/d for one week   - smoking cessation advised     #Weight Loss  patient states has had decreased appetite 2/2 stress. has lost weight over the past few months  - started Ensure chocolate flavor 2 bottles a day  -  RN ok for friend to bring in Boost chocolate flavor and they can be kept in the [facility] fridge    Med consult will sign off.

## 2023-10-30 NOTE — BH INPATIENT PSYCHIATRY DISCHARGE NOTE - HOSPITAL COURSE
This is a 77y/o  female, , retired, domiciled in apartment alone. No pertinent medical hx. Psychiatric hx of reported depression, anxiety, insomnia, currently in treatment with Dr. Stevens (remeron, klonopin, trazodone). No prior psychiatric hospitalizations, no hx of SA or NSSIB. No hx of drug/etoh abuse. Hx of trauma in childhood. No current legal issues. Patient presents to ED at recommendation of her psychiatrist after disclosing to him that she had overdosed on her medications the night before- a combination of remeron, klonopin and trazodone. Utox negative for all substances. Patient admitted to [address] after suicide attempt.     While on [facility] the patient participated in groups, socialized with others on the unit and visited with her friends who came daily during visiting hours. Patient was continued on her home medications Trazadone 50mg orally qd at bedtime for sleep, mirtazapine 7.5mg orally qd at bedtime for sleep, appetite, and mood, as well as klonopin 0.5mg orally at bedtime for sleep and anxiety. While here at [facility] the patient had difficulty sleeping so the mirtazapine was increased to 15mg orally qd at bedtime and an additional 50mg of Trazadone orally PRN was added to help with sleep. Patient was also started on an antidepressant escitalopram 5mg PO qd for anxiety and depression on 10/19. The dose was titrated up to 10mg of escitalopram PO qd on 10/20. The patient was also started on nicotine replacement therapy of nicotine Polacrilex Gum 2mg q2hrs PRN, but the patient complained it was difficult to chew so it was switched to nicotine transdermal patch 21 mG/24hrs. While here the patient also complained of occasional nausea and constipation so polyethylene glycol 17grams orally daily was added for constipation and ondansetron 4mg PO PRN was added for nausea. While on [facility] the patient's blood pressure was consistently elevated so medicine was consulted. Patient was started on losartan 15mg PO qd with continued elevated blood pressure. The losartan was then increased to 50mg PO qd and nifedipine 30mg PO qd was also added for sustained hypertension. The patient also requested an influenza vaccine and was given 0.7mL intramuscular once on 10/17.     The patient presented to the unit acutely suicidal and after getting on an antidepressant, sleeping better, outpatient therapy getting set up, and having the support from her friends that she needed to sort through her triggers with financial difficulty, the patient is no longer endorsing suicidal ideation and is future oriented. This is a 77y/o  female, , retired, domiciled in apartment alone. No pertinent medical hx. Psychiatric hx of reported depression, anxiety, insomnia, currently in treatment with Dr. Stevens (remeron, klonopin, trazodone). No prior psychiatric hospitalizations, no hx of SA or NSSIB. No hx of drug/etoh abuse. Hx of trauma in childhood. No current legal issues. Patient presents to ED at recommendation of her psychiatrist after disclosing to him that she had overdosed on her medications the night before- a combination of remeron, klonopin and trazodone. Utox negative for all substances. Patient admitted to [address] after suicide attempt.     While on [facility] the patient participated in groups, socialized with others on the unit and visited with her friends who came daily during visiting hours. Patient was continued on her home medications Trazadone 50mg orally qd at bedtime for sleep, mirtazapine 7.5mg orally qd at bedtime for sleep, appetite, and mood, as well as klonopin 0.5mg orally at bedtime for sleep and anxiety. While here at [facility] the patient had difficulty sleeping so the mirtazapine was increased to 15mg orally qd at bedtime and an additional 50mg of Trazadone orally PRN was added to help with sleep. Patient was also started on an antidepressant escitalopram 5mg PO qd for anxiety and depression on 10/19. The dose was titrated up to 10mg of escitalopram PO qd on 10/20. The patient was also started on nicotine replacement therapy of nicotine Polacrilex Gum 2mg q2hrs PRN, but the patient complained it was difficult to chew so it was switched to nicotine transdermal patch 21 mG/24hrs. While here the patient also complained of occasional nausea and constipation so polyethylene glycol 17grams orally daily was added for constipation and ondansetron 4mg PO PRN was added for nausea. While on [facility] the patient's blood pressure was consistently elevated so medicine was consulted. Patient was started on losartan 15mg PO qd with continued elevated blood pressure. The losartan was then increased to 50mg PO qd and nifedipine 30mg PO qd was also added for sustained hypertension. The patient also requested an influenza vaccine and was given 0.7mL intramuscular once on 10/17.     The patient presented to the unit acutely suicidal and after getting on an antidepressant, sleeping better, outpatient therapy getting set up, and having the support from her friends that she needed to sort through her triggers with financial difficulty, the patient is no longer endorsing suicidal ideation and is future oriented. On 10/30 the patient and friend was seen with NP, PA-S, and  to go over outpatient planning and safety plans for once the patient is discharged. Upon initial presentation to the unit, patient expressed despair, high level of depression, disappointment regarding failed suicide attempt and hopelessness. She denied active suicide attempt but expressed strong desire to die frequently stating that she had lived a good life and that there was nothing left for her to do at the age of 78. She shared psychosocial stressors affecting her mood which included finances, the future selling of her apartment, lack of familial support etc.   Patient was continued on her home medications Trazadone 50mg orally qd at bedtime for sleep, mirtazapine 7.5mg orally qd at bedtime for sleep, appetite, and mood, as well as klonopin 0.5mg orally at bedtime for sleep and anxiety. She was open to trial of an antidepressant and was agreeable with a trial of lexapro which was started at 5mg qd for several days to treat both her anxiety and depression, ultimately the dose was titrated up to 10mg PO qd on 10/20. Overall tolerated well without issue.   While here at [facility] the patient had difficulty sleeping so the mirtazapine was increased to 15mg orally qd at bedtime and an additional 50mg of Trazadone orally PRN was added to help with sleep for a total of 100mg qhs. The patient was also started on nicotine replacement therapy of nicotine Polacrilex Gum 2mg q2hrs PRN, but the patient complained it was difficult to chew so it was switched to nicotine transdermal patch 21 mG/24hrs. While here the patient also complained of occasional nausea and constipation so polyethylene glycol 17grams orally daily was added for constipation and ondansetron 4mg PO PRN was added for nausea. During her admission to [facility] the patient's blood pressure was consistently elevated, medicine team was consulted for recs. Ultimate recs of losartan 50mg PO qd and nifedipine 30mg PO qd for sustained hypertension. The patient also requested an influenza vaccine and was given 0.7mL intramuscular once on 10/17. Nutritional consult also placed.    The patient presented to the unit acutely suicidal and after getting on an antidepressant, sleeping better, outpatient therapy getting set up, and having the support from her friends that she needed to sort through her triggers with financial difficulty, the patient is no longer endorsing suicidal ideation and is future oriented. On 10/30 and 10/31 the patient and friend was seen with NP, PA-S, and  to go over outpatient planning and safety plans for once the patient is discharged. During her admission she was noted to be quite visible on the unit, attending multiple groups, interacting well with peers and staff.

## 2023-10-30 NOTE — BH INPATIENT PSYCHIATRY DISCHARGE NOTE - NSBHMETABOLIC_PSY_ALL_CORE_FT
BMI: BMI (kg/m2): 18.3 (10-16-23 @ 14:29)  HbA1c: A1C with Estimated Average Glucose Result: 5.2 % (10-18-23 @ 14:50)    Glucose:   BP: 131/82 (10-30-23 @ 08:40) (131/82 - 147/78)  Lipid Panel: Date/Time: 10-18-23 @ 14:50  Cholesterol, Serum: 249  Direct LDL: --  HDL Cholesterol, Serum: 54  Total Cholesterol/HDL Ration Measurement: --  Triglycerides, Serum: 115   BMI: BMI (kg/m2): 18.3 (10-16-23 @ 14:29)  HbA1c: A1C with Estimated Average Glucose Result: 5.2 % (10-18-23 @ 14:50)    Glucose:   BP: 131/82 (10-30-23 @ 08:40) (131/82 - 147/78)  Lipid Panel: Date/Time: 10-18-23 @ 14:50  Cholesterol, Serum: 249  Direct LDL: 172  HDL Cholesterol, Serum: 54  Total Cholesterol/HDL Ration Measurement: --  Triglycerides, Serum: 115

## 2023-10-30 NOTE — BH INPATIENT PSYCHIATRY DISCHARGE NOTE - NSBHASSESSSUMMFT_PSY_ALL_CORE
This is a 77y/o  female, , retired, domiciled in apartment alone. No pertinent medical hx. Psychiatric hx of reported depression, anxiety, insomnia, currently in treatment with Dr. Stevens (remeron, klonopin, trazodone). No prior psychiatric hospitalizations, no hx of SA or NSSIB. No hx of drug/etoh abuse. Hx of trauma in childhood. No current legal issues. Patient presents to ED at recommendation of her psychiatrist after disclosing to him that she had overdosed on her medications the night before- a combination of remeron, klonopin and trazodone. Utox negative for all substances. Patient admitted to [address] [place] [address].

## 2023-10-30 NOTE — BH TREATMENT PLAN - NSPTSTATEDGOAL_PSY_ALL_CORE
Patient would like to get treated to reduce depressive symptoms.
Patient would like to get treated to reduce depressive symptoms.

## 2023-10-31 PROCEDURE — 99232 SBSQ HOSP IP/OBS MODERATE 35: CPT

## 2023-10-31 PROCEDURE — 99231 SBSQ HOSP IP/OBS SF/LOW 25: CPT

## 2023-10-31 RX ORDER — TRAZODONE HCL 50 MG
1 TABLET ORAL
Qty: 14 | Refills: 0
Start: 2023-10-31 | End: 2023-11-13

## 2023-10-31 RX ORDER — TOBRAMYCIN 0.3 %
1 DROPS OPHTHALMIC (EYE)
Qty: 1 | Refills: 0
Start: 2023-10-31 | End: 2023-11-13

## 2023-10-31 RX ORDER — LOSARTAN POTASSIUM 100 MG/1
1 TABLET, FILM COATED ORAL
Qty: 30 | Refills: 0
Start: 2023-10-31 | End: 2023-11-29

## 2023-10-31 RX ORDER — CLONAZEPAM 1 MG
1 TABLET ORAL
Qty: 14 | Refills: 0
Start: 2023-10-31 | End: 2023-11-13

## 2023-10-31 RX ORDER — TOBRAMYCIN 0.3 %
1 DROPS OPHTHALMIC (EYE)
Qty: 0 | Refills: 0 | DISCHARGE
Start: 2023-10-31

## 2023-10-31 RX ORDER — NICOTINE POLACRILEX 2 MG
1 GUM BUCCAL
Qty: 30 | Refills: 0
Start: 2023-10-31 | End: 2023-11-29

## 2023-10-31 RX ORDER — ESCITALOPRAM OXALATE 10 MG/1
1 TABLET, FILM COATED ORAL
Qty: 14 | Refills: 0
Start: 2023-10-31 | End: 2023-11-13

## 2023-10-31 RX ORDER — NICOTINE POLACRILEX 2 MG
1 GUM BUCCAL
Qty: 0 | Refills: 0 | DISCHARGE
Start: 2023-10-31

## 2023-10-31 RX ORDER — NIFEDIPINE 30 MG
1 TABLET, EXTENDED RELEASE 24 HR ORAL
Qty: 30 | Refills: 0
Start: 2023-10-31 | End: 2023-11-29

## 2023-10-31 RX ORDER — MIRTAZAPINE 45 MG/1
1 TABLET, ORALLY DISINTEGRATING ORAL
Qty: 14 | Refills: 0
Start: 2023-10-31 | End: 2023-11-13

## 2023-10-31 RX ORDER — CLONAZEPAM 1 MG
1 TABLET ORAL
Qty: 0 | Refills: 0 | DISCHARGE
Start: 2023-10-31

## 2023-10-31 RX ADMIN — Medication 1 DROP(S): at 10:05

## 2023-10-31 RX ADMIN — Medication 30 MILLIGRAM(S): at 10:05

## 2023-10-31 RX ADMIN — Medication 1 DROP(S): at 21:23

## 2023-10-31 RX ADMIN — LOSARTAN POTASSIUM 50 MILLIGRAM(S): 100 TABLET, FILM COATED ORAL at 10:05

## 2023-10-31 RX ADMIN — Medication 100 MILLIGRAM(S): at 21:25

## 2023-10-31 RX ADMIN — Medication 0.5 MILLIGRAM(S): at 21:25

## 2023-10-31 RX ADMIN — MIRTAZAPINE 15 MILLIGRAM(S): 45 TABLET, ORALLY DISINTEGRATING ORAL at 21:25

## 2023-10-31 RX ADMIN — Medication 1 PATCH: at 10:05

## 2023-10-31 RX ADMIN — Medication 1 DROP(S): at 12:19

## 2023-10-31 RX ADMIN — ESCITALOPRAM OXALATE 10 MILLIGRAM(S): 10 TABLET, FILM COATED ORAL at 10:05

## 2023-10-31 NOTE — BH INPATIENT PSYCHIATRY PROGRESS NOTE - PRN MEDS
MEDICATIONS  (PRN):  acetaminophen     Tablet .. 650 milliGRAM(s) Oral every 6 hours PRN Moderate Pain (4 - 6)  nicotine  Polacrilex Gum 2 milliGRAM(s) Oral every 2 hours PRN nicotine dependence without withdrawal  ondansetron    Tablet 4 milliGRAM(s) Oral once PRN nausea  traZODone 50 milliGRAM(s) Oral at bedtime PRN insomnia  
MEDICATIONS  (PRN):  nicotine  Polacrilex Gum 2 milliGRAM(s) Oral every 2 hours PRN nicotine dependence without withdrawal  ondansetron    Tablet 4 milliGRAM(s) Oral once PRN nausea  traZODone 50 milliGRAM(s) Oral at bedtime PRN insomnia  
MEDICATIONS  (PRN):  LORazepam     Tablet 1 milliGRAM(s) Oral every 6 hours PRN agitation/benzo withdrawal  nicotine  Polacrilex Gum 2 milliGRAM(s) Oral every 2 hours PRN nicotine dependence without withdrawal  traZODone 50 milliGRAM(s) Oral at bedtime PRN insomnia  
MEDICATIONS  (PRN):  acetaminophen     Tablet .. 650 milliGRAM(s) Oral every 6 hours PRN Moderate Pain (4 - 6)  nicotine  Polacrilex Gum 2 milliGRAM(s) Oral every 2 hours PRN nicotine dependence without withdrawal  ondansetron    Tablet 4 milliGRAM(s) Oral once PRN nausea  traZODone 50 milliGRAM(s) Oral at bedtime PRN insomnia  
MEDICATIONS  (PRN):  nicotine  Polacrilex Gum 2 milliGRAM(s) Oral every 2 hours PRN nicotine dependence without withdrawal  ondansetron    Tablet 4 milliGRAM(s) Oral once PRN nausea  traZODone 50 milliGRAM(s) Oral at bedtime PRN insomnia  
MEDICATIONS  (PRN):  acetaminophen     Tablet .. 650 milliGRAM(s) Oral every 6 hours PRN Moderate Pain (4 - 6)  nicotine  Polacrilex Gum 2 milliGRAM(s) Oral every 2 hours PRN nicotine dependence without withdrawal  ondansetron    Tablet 4 milliGRAM(s) Oral once PRN nausea  traZODone 50 milliGRAM(s) Oral at bedtime PRN insomnia  
MEDICATIONS  (PRN):  LORazepam     Tablet 1 milliGRAM(s) Oral every 6 hours PRN agitation/benzo withdrawal  nicotine  Polacrilex Gum 2 milliGRAM(s) Oral every 2 hours PRN nicotine dependence without withdrawal  traZODone 50 milliGRAM(s) Oral at bedtime PRN insomnia  
MEDICATIONS  (PRN):  LORazepam     Tablet 1 milliGRAM(s) Oral every 6 hours PRN agitation/benzo withdrawal  nicotine  Polacrilex Gum 2 milliGRAM(s) Oral every 2 hours PRN nicotine dependence without withdrawal  traZODone 50 milliGRAM(s) Oral at bedtime PRN insomnia  
MEDICATIONS  (PRN):  acetaminophen     Tablet .. 650 milliGRAM(s) Oral every 6 hours PRN Moderate Pain (4 - 6)  nicotine  Polacrilex Gum 2 milliGRAM(s) Oral every 2 hours PRN nicotine dependence without withdrawal  ondansetron    Tablet 4 milliGRAM(s) Oral once PRN nausea  traZODone 50 milliGRAM(s) Oral at bedtime PRN insomnia  
MEDICATIONS  (PRN):  LORazepam     Tablet 1 milliGRAM(s) Oral every 6 hours PRN agitation/benzo withdrawal  nicotine  Polacrilex Gum 2 milliGRAM(s) Oral every 2 hours PRN nicotine dependence without withdrawal  traZODone 50 milliGRAM(s) Oral at bedtime PRN insomnia  
MEDICATIONS  (PRN):  acetaminophen     Tablet .. 650 milliGRAM(s) Oral every 6 hours PRN Moderate Pain (4 - 6)  nicotine  Polacrilex Gum 2 milliGRAM(s) Oral every 2 hours PRN nicotine dependence without withdrawal  ondansetron    Tablet 4 milliGRAM(s) Oral once PRN nausea  
MEDICATIONS  (PRN):  LORazepam     Tablet 1 milliGRAM(s) Oral every 6 hours PRN agitation/benzo withdrawal  nicotine  Polacrilex Gum 2 milliGRAM(s) Oral every 2 hours PRN nicotine dependence without withdrawal  traZODone 50 milliGRAM(s) Oral at bedtime PRN insomnia  
MEDICATIONS  (PRN):  LORazepam     Tablet 1 milliGRAM(s) Oral every 6 hours PRN agitation/benzo withdrawal  nicotine  Polacrilex Gum 2 milliGRAM(s) Oral every 2 hours PRN nicotine dependence without withdrawal  traZODone 50 milliGRAM(s) Oral at bedtime PRN insomnia

## 2023-10-31 NOTE — BH INPATIENT PSYCHIATRY PROGRESS NOTE - NSTXDEPRESDATEEST_PSY_ALL_CORE
23-Oct-2023
17-Oct-2023
17-Oct-2023
23-Oct-2023
17-Oct-2023
23-Oct-2023
23-Oct-2023
17-Oct-2023
17-Oct-2023
23-Oct-2023
23-Oct-2023

## 2023-10-31 NOTE — BH INPATIENT PSYCHIATRY PROGRESS NOTE - NSICDXBHPRIMARYDX_PSY_ALL_CORE
Severe episode of recurrent major depressive disorder, without psychotic features   F33.2  

## 2023-10-31 NOTE — BH INPATIENT PSYCHIATRY PROGRESS NOTE - CURRENT MEDICATION
MEDICATIONS  (STANDING):  clonazePAM  Tablet 0.5 milliGRAM(s) Oral at bedtime  escitalopram 10 milliGRAM(s) Oral daily  losartan 50 milliGRAM(s) Oral daily  mirtazapine 15 milliGRAM(s) Oral at bedtime  nicotine - 21 mG/24Hr(s) Patch 1 Patch Transdermal daily  NIFEdipine XL 30 milliGRAM(s) Oral daily  polyethylene glycol 3350 17 Gram(s) Oral at bedtime  tobramycin 0.3% Ophthalmic Solution 1 Drop(s) Left EYE three times a day  traZODone 100 milliGRAM(s) Oral at bedtime    MEDICATIONS  (PRN):  acetaminophen     Tablet .. 650 milliGRAM(s) Oral every 6 hours PRN Moderate Pain (4 - 6)  nicotine  Polacrilex Gum 2 milliGRAM(s) Oral every 2 hours PRN nicotine dependence without withdrawal  ondansetron    Tablet 4 milliGRAM(s) Oral once PRN nausea

## 2023-10-31 NOTE — BH INPATIENT PSYCHIATRY PROGRESS NOTE - NSTXSUICIDPROGRES_PSY_ALL_CORE
Improving
No Change

## 2023-10-31 NOTE — BH INPATIENT PSYCHIATRY PROGRESS NOTE - NSBHATTESTBILLONSITE_PSY_A_CORE
JOSE to bill

## 2023-10-31 NOTE — BH INPATIENT PSYCHIATRY PROGRESS NOTE - NSBHMSEPERCEPT_PSY_A_CORE
no elicited or endorsed AVH/PI/No abnormalities

## 2023-10-31 NOTE — BH INPATIENT PSYCHIATRY PROGRESS NOTE - NSICDXBHSECONDARYDX_PSY_ALL_CORE
Severe episode of recurrent major depressive disorder, without psychotic features   F33.2  

## 2023-10-31 NOTE — BH INPATIENT PSYCHIATRY PROGRESS NOTE - NSBHFUPINTERVALHXFT_PSY_A_CORE
Patient visible on the unit, observed socializing with peers and staff, attending groups regularly. Patient seen by writer and NP Lorin Mckay. Patient calm, cooperative, with good eye contact. Patient described mood as bright, anticipating the upcoming discharge. Patient is future oriented, discussing the SPOP program and her plans to talk to her psychologist once she is discharged. Patient states this unit has really helped her, it has shown her how much she loves socializing with people and brought that aspect of herself back. Patient states she is excited to meet new people and continue to enjoy the social aspect of her life once she is discharged. Patient states she is thankful for the help she has received here and can see the difference in herself from when she arrived to where she is now. Patient endorses good sleep and appetite. She denies feeling of depression, SI, HI, auditory/visual hallucinations. Per staff report, patient has had a bright mood, attending groups, and is discharge focused.

## 2023-10-31 NOTE — PROGRESS NOTE ADULT - PROVIDER SPECIALTY LIST ADULT
Internal Medicine
Hospitalist
Hospitalist
Internal Medicine

## 2023-10-31 NOTE — BH INPATIENT PSYCHIATRY PROGRESS NOTE - NSTXDCFAMDATEEST_PSY_ALL_CORE
17-Oct-2023

## 2023-10-31 NOTE — BH INPATIENT PSYCHIATRY PROGRESS NOTE - NSBHMETABOLIC_PSY_ALL_CORE_FT
BMI: BMI (kg/m2): 18.3 (10-16-23 @ 14:29)  HbA1c: A1C with Estimated Average Glucose Result: 5.2 % (10-18-23 @ 14:50)    Glucose:   BP: 135/80 (10-31-23 @ 09:00) (131/82 - 146/82)  Lipid Panel: Date/Time: 10-18-23 @ 14:50  Cholesterol, Serum: 249  Direct LDL: --  HDL Cholesterol, Serum: 54  Total Cholesterol/HDL Ration Measurement: --  Triglycerides, Serum: 115

## 2023-10-31 NOTE — BH INPATIENT PSYCHIATRY PROGRESS NOTE - NSTXDCFAMGOALOTHER_PSY_ALL_CORE
Patient will explore psychosocial issues and barriers to discharge with  while engaging in discharge planning for 30 minutes per week.

## 2023-10-31 NOTE — BH INPATIENT PSYCHIATRY PROGRESS NOTE - NSTXSUICIDDATETRGT_PSY_ALL_CORE
30-Oct-2023
23-Oct-2023
30-Oct-2023
23-Oct-2023
30-Oct-2023
30-Oct-2023
23-Oct-2023

## 2023-10-31 NOTE — BH INPATIENT PSYCHIATRY PROGRESS NOTE - NSBHMSEAFFQUAL_PSY_A_CORE
Depressed/Anxious
Depressed/Anxious
Euthymic/Anxious
Euthymic/Anxious
Depressed/Anxious
Euthymic
Depressed/Anxious
Euthymic/Anxious
Euthymic/Anxious
Euthymic

## 2023-10-31 NOTE — BH INPATIENT PSYCHIATRY PROGRESS NOTE - NSTXDCFAMDATETRGT_PSY_ALL_CORE
30-Oct-2023
24-Oct-2023
30-Oct-2023
24-Oct-2023
24-Oct-2023
30-Oct-2023

## 2023-10-31 NOTE — BH INPATIENT PSYCHIATRY PROGRESS NOTE - NSTXDEPRESINTERMD_PSY_ALL_CORE
Hospitalist Progress Note  Deandre Acharya NP  Answering service: [phone] from in house phone  Cell: [phone]   Date of Service:  2019  NAME:  Orlando Galdamez  :  3/18/1927  MRN:  [id number]    Admission Summary:   Pt was brought into the ED by EMS for hip pain. He reported he was walking on uneven ground and fell, subsequently unable to standup. Denied chest pain or dizziness prior to the fall. He apparently walks without any assisted device and able to climb stairs in a 3 story buidling. Interval history / Subjective:   Pt in bed, just worked with PT and now back in bed. Has no pain and is feeling well. Assessment & Plan:     Fall:  - tried to get OOB early am  and fell  - Xray with no acute process/displacement  - note that nurse updated family on fall yesterday    Hypertension:  - on no medications PTA, metoprol started yesterday (first dose last night and received another dose already this am)  - BP not optimized, norvasc increased earlier this am and will increase again in am    L Hip Fracture:   -  X-ray showed left femoral neck fracture. - s/p Left hip hemiarthroplasty 2019. POD 3  - has prn tylenol ordered, had dose last pm  - continue with bowel regimen + BM today  - DVT ppx as per Ortho: on lovenox  - PT/OT  - plan is for Acute Rehab, [facility]. Doing well with therapy. Hx coronary artery disease with valvular replacement:  - no any acute cardiac issues that needs intervention  - EKG, which showed first-degree AV block  - cardiology evaluated before surgery  - pt 325 mg ASA at home  - SR with frequent PVCs on tele. Labs ok    Underweight:   - BMI 19.9 with this appearance  - nutrition has seen, pt on oral supplements for additional calories    Disorientation:   - can be re-oriented, he remembers how he feel consistently - sometimes has trouble remembering where he is.      Code status: Full  DVT
prophylaxis: lovenox  Care Plan discussed with: patient, nurse  Disposition: Inpatient Rehab. [facility] reviewing chart. No new updates for family - awaiting approval for rehab. Hospital Problems  Date Reviewed: 7/2/2015          Codes Class Noted POA    Fracture of unspecified part of neck of left femur, initial encounter for closed fracture [facility]) ICD-10-CM: X76.661B  ICD-9-CM: 820.8  9/3/2019 Unknown            Review of Systems:   Denies HA. No chest pain, pressure. No shortness of breath. No GI or urinary complaints. No left hip pain. Vital Signs:    Last 24hrs VS reviewed since prior progress note. Most recent are:  Visit Vitals  /73 (BP 1 Location: Left arm, BP Patient Position: At rest)   Pulse 78   Temp 98.1 °F (36.7 °C)   Resp 16   Ht 5' 10\" (1.778 m)   Wt 63.2 kg (139 lb 5.3 oz)   SpO2 98%   BMI 19.99 kg/m²       Intake/Output Summary (Last 24 hours) at 9/8/2019 1601  Last data filed at 9/7/2019 1929  Gross per 24 hour   Intake 250 ml   Output    Net 250 ml      Physical Examination:         Constitutional:  No acute distress, cooperative, pleasant    ENT:  Oral MM moist, oropharynx benign. Resp:  Fine crackles in left base, clear with cough. No accessory muscle use and on RA   CV:  Regular rhythm, normal rate. SR with PVCs (frequent)    GI:  Soft, non distended, non tender. Normoactive bowel sounds + BM    Musculoskeletal:  No edema, warm, 2+ pulses throughout    Neurologic:  Moves all extremities. AAOx2. Follows all commands. Psych: Calm and cooperative     Data Review:   Review and/or order of clinical lab test  Review and/or order of tests in the medicine section of CPT    Labs:     Recent Labs     09/06/19  0233   HGB 10.3*     Recent Labs     09/08/19  0153      K 4.5   *   CO2 27   BUN 37*   CREA 1.09   *   CA 9.7   MG 2.0     No results for input(s): SGOT, GPT, ALT, AP, TBIL, TBILI, TP, ALB, GLOB, GGT, AML, LPSE in the last 72 hours.     No lab exists for
component: AMYP, HLPSE  No results for input(s): INR, PTP, APTT in the last 72 hours. No lab exists for component: INREXT, INREXT   No results for input(s): FE, TIBC, PSAT, FERR in the last 72 hours. No results found for: FOL, RBCF   No results for input(s): PH, PCO2, PO2 in the last 72 hours. No results for input(s): CPK, CKNDX, TROIQ in the last 72 hours.     No lab exists for component: CPKMB  Lab Results   Component Value Date/Time    Cholesterol, total 127 02/19/2010 11:45 AM    HDL Cholesterol 55 02/19/2010 11:45 AM    LDL, calculated 63.6 02/19/2010 11:45 AM    Triglyceride 42 02/19/2010 11:45 AM    CHOL/HDL Ratio 2.3 02/19/2010 11:45 AM     Lab Results   Component Value Date/Time    Glucose (POC) 91 09/04/2019 02:27 PM    Glucose (POC) 94 09/03/2019 03:20 PM     Lab Results   Component Value Date/Time    Color YELLOW/STRAW 09/03/2019 01:21 PM    Appearance CLEAR 09/03/2019 01:21 PM    Specific gravity 1.011 09/03/2019 01:21 PM    pH (UA) 7.5 09/03/2019 01:21 PM    Protein 30 (A) 09/03/2019 01:21 PM    Glucose NEGATIVE  09/03/2019 01:21 PM    Ketone NEGATIVE  09/03/2019 01:21 PM    Bilirubin NEGATIVE  09/03/2019 01:21 PM    Urobilinogen 1.0 09/03/2019 01:21 PM    Nitrites NEGATIVE  09/03/2019 01:21 PM    Leukocyte Esterase NEGATIVE  09/03/2019 01:21 PM    Epithelial cells FEW 09/03/2019 01:21 PM    Bacteria NEGATIVE  09/03/2019 01:21 PM    WBC 0-4 09/03/2019 01:21 PM    RBC 0-5 09/03/2019 01:21 PM     Medications Reviewed:     Current Facility-Administered Medications   Medication Dose Route Frequency    amLODIPine (NORVASC) tablet 5 mg  5 mg Oral DAILY    [START ON 9/9/2019] enoxaparin (LOVENOX) injection 40 mg  40 mg SubCUTAneous DAILY    metoprolol tartrate (LOPRESSOR) tablet 50 mg  50 mg Oral BID    sodium chloride (NS) flush 5-40 mL  5-40 mL IntraVENous Q8H    sodium chloride (NS) flush 5-40 mL  5-40 mL IntraVENous PRN    naloxone (NARCAN) injection 0.4 mg  0.4 mg IntraVENous PRN   
calcium-vitamin D (OS-OLEKSANDR) 500 mg-200 unit tablet  1 Tab Oral TID WITH MEALS    senna-docusate (PERICOLACE) 8.6-50 mg per tablet 1 Tab  1 Tab Oral BID    polyethylene glycol (MIRALAX) packet 17 g  17 g Oral DAILY    bisacodyl (DULCOLAX) suppository 10 mg  10 mg Rectal DAILY PRN    traMADol (ULTRAM) tablet 50 mg  50 mg Oral Q6H PRN    oxyCODONE IR (ROXICODONE) tablet 2.5 mg  2.5 mg Oral Q4H PRN    docusate sodium (COLACE) capsule 100 mg  100 mg Oral BID PRN    nitroglycerin (NITROSTAT) tablet 0.4 mg  0.4 mg SubLINGual Q5MIN PRN    sodium chloride (NS) flush 5-40 mL  5-40 mL IntraVENous Q8H    sodium chloride (NS) flush 5-40 mL  5-40 mL IntraVENous PRN    naloxone (NARCAN) injection 0.4 mg  0.4 mg IntraVENous PRN    hydrALAZINE (APRESOLINE) 20 mg/mL injection 10 mg  10 mg IntraVENous Q6H PRN    acetaminophen (TYLENOL) tablet 500 mg  500 mg Oral Q6H PRN   _____________________________________________________________________  EXPECTED LENGTH OF STAY: 2d 21h  ACTUAL LENGTH OF STAY:          5               Kasie Robledo NP
Psychopharmacology x15 minutes

## 2023-10-31 NOTE — BH INPATIENT PSYCHIATRY PROGRESS NOTE - NSBHMSEINTELL_PSY_A_CORE
Above average

## 2023-10-31 NOTE — BH INPATIENT PSYCHIATRY PROGRESS NOTE - NSTXSUICIDDATEEST_PSY_ALL_CORE
16-Oct-2023

## 2023-10-31 NOTE — BH INPATIENT PSYCHIATRY PROGRESS NOTE - NSBHMSETHTCONTENT_PSY_A_CORE
Unremarkable
Suicidality
Preoccupations/Hopelessness/Suicidality
Suicidality
Guilt/Suicidality
Unremarkable
Unremarkable
Suicidality
Hopelessness/Suicidality
Preoccupations/Hopelessness/Suicidality
Unremarkable

## 2023-10-31 NOTE — PROGRESS NOTE ADULT - ASSESSMENT
77 y/o f with PMH of depression presents to ED with suicidal attempt by overdose. Medicine consulted for hypertension.     #HTN  BP improved, remains at goal now  - c/w Losartan 50mg & Nidefipine ER 30mg Qd  - monitor BP    #Left eye blepharitis  pt with chronic blepharitis for which she gets tobra eye drops 4 times a year.   - tobramycin opthalmic solution, 1 drop to left eye every 4 hours until symptoms resolve      #Smoking   - started nicotine patch 21mg/d for one week ( 10/20-) the taper 14mg/d for one week then 7gm/d for one week   - smoking cessation advised     #Weight Loss  patient states has had decreased appetite 2/2 stress. has lost weight over the past few months  - started Ensure chocolate flavor 2 bottles a day  -  RN ok for friend to bring in Boost chocolate flavor and they can be kept in the [employer] fridge    Med consult will sign off.

## 2023-10-31 NOTE — BH INPATIENT PSYCHIATRY PROGRESS NOTE - NSBHFUPINTERVALCCFT_PSY_A_CORE
"I feel hopeful' 
"better"
"I feel bright"
"I have no optimism for the future"
'That cocktail was good'
I don't like being here
'I am not brave'
I feel sad
'There is light at the end of the tunnel'
'Sleep was better'
'I am very depressed'
"I am feeling better regarding the future"

## 2023-10-31 NOTE — PROGRESS NOTE ADULT - SUBJECTIVE AND OBJECTIVE BOX
INTERVAL HPI/OVERNIGHT EVENTS:  Patient was seen and examined at bedside. No overnight events.  Patient reports feeling well, no complaints at this time. ROS otherwise negative.     VITAL SIGNS:  T(F): 98.8 (10-31-23 @ 16:50)  HR: 73 (10-31-23 @ 16:50)  BP: 135/90 (10-31-23 @ 16:50)  RR: 18 (10-31-23 @ 16:50)  SpO2: 97% (10-31-23 @ 16:50)  Wt(kg): --    PHYSICAL EXAM:  Constitutional: appears comfortable, no acute distress  HEENT: sclera non-icteric  Respiratory: without accessory muscle use and no intercostal retractions  Neurological: AAOx3, CN Grossly intact    MEDICATIONS  (STANDING):  clonazePAM  Tablet 0.5 milliGRAM(s) Oral at bedtime  escitalopram 10 milliGRAM(s) Oral daily  losartan 50 milliGRAM(s) Oral daily  mirtazapine 15 milliGRAM(s) Oral at bedtime  nicotine - 21 mG/24Hr(s) Patch 1 Patch Transdermal daily  NIFEdipine XL 30 milliGRAM(s) Oral daily  polyethylene glycol 3350 17 Gram(s) Oral at bedtime  tobramycin 0.3% Ophthalmic Solution 1 Drop(s) Left EYE three times a day  traZODone 100 milliGRAM(s) Oral at bedtime    MEDICATIONS  (PRN):  acetaminophen     Tablet .. 650 milliGRAM(s) Oral every 6 hours PRN Moderate Pain (4 - 6)  nicotine  Polacrilex Gum 2 milliGRAM(s) Oral every 2 hours PRN nicotine dependence without withdrawal  ondansetron    Tablet 4 milliGRAM(s) Oral once PRN nausea      Allergies    penicillin (Hives)  tetracycline (Hives)    Intolerances        LABS:                  RADIOLOGY & ADDITIONAL TESTS:  Reviewed

## 2023-10-31 NOTE — BH INPATIENT PSYCHIATRY PROGRESS NOTE - NSBHMSEKNOWHOW_PSY_ALL_CORE
Current Events/Vocabulary

## 2023-10-31 NOTE — BH INPATIENT PSYCHIATRY PROGRESS NOTE - NSBHASSESSSUMMFT_PSY_ALL_CORE
This is a 79y/o  female, , retired, domiciled in apartment alone. No pertinent medical hx. Psychiatric hx of reported depression, anxiety, insomnia, currently in treatment with Dr. Stevens (remeron, klonopin, trazodone). No prior psychiatric hospitalizations, no hx of SA or NSSIB. No hx of drug/etoh abuse. Hx of trauma in childhood. No current legal issues. Patient presents to ED at recommendation of her psychiatrist after disclosing to him that she had overdosed on her medications the night before- a combination of remeron, klonopin and trazodone. Utox negative for all substances. Patient admitted to [facility] 2p.    -Lexapro 10mg PO daily  -Klonopin 0.5mg PO qhs  -Remeron 15mg PO qHS  -medicine following, nutritional supplement, moisturizer, BP meds and NRT

## 2023-10-31 NOTE — BH INPATIENT PSYCHIATRY PROGRESS NOTE - NSTXDEPRESGOALOTHER_PSY_ALL_CORE
Pt will participate in groups and milieu treatment structure of the unit
Pt will participate in groups and milieu treatment structure of the unit
Pt. will participate in groups and milieu tx. structure of unit
Pt will participate in groups and milieu treatment structure of the unit
Pt. will participate in groups and milieu tx. structure of unit
Pt will participate in groups and milieu treatment structure of the unit
Pt will participate in groups and milieu treatment structure of the unit
Pt. will participate in groups and milieu tx. structure of unit

## 2023-10-31 NOTE — BH INPATIENT PSYCHIATRY PROGRESS NOTE - NSDCCRITERIA_PSY_ALL_CORE
Improvement in mood and sxs

## 2023-10-31 NOTE — BH INPATIENT PSYCHIATRY PROGRESS NOTE - NSBHMSEMOOD_PSY_A_CORE
Depressed/Anxious
Normal
Depressed
Depressed
Normal
Depressed/Anxious
Depressed

## 2023-10-31 NOTE — BH INPATIENT PSYCHIATRY PROGRESS NOTE - NSICDXBHTERTIARYDX_PSY_ALL_CORE
R/O Cluster C personality disorder   F60.89  

## 2023-10-31 NOTE — BH INPATIENT PSYCHIATRY PROGRESS NOTE - NSBHATTESTTYPEVISIT_PSY_A_CORE
On-site Attending supervising JOSE (99XXX codes)
On-site Attending supervising JOSE (99XXX codes)
Attending Only
On-site Attending supervising JOSE (99XXX codes)
On-site Attending supervising JOSE (99XXX codes)
Attending Only
Attending Only
Attending with Resident/Fellow/Student
On-site Attending with Resident/Fellow/Student and JOSE (99XXX codes)
On-site Attending supervising JOSE (99XXX codes)
On-site Attending with Resident/Fellow/Student and JOSE (99XXX codes)
On-site Attending with Resident/Fellow/Student and JOSE (99XXX codes)

## 2023-10-31 NOTE — BH INPATIENT PSYCHIATRY PROGRESS NOTE - NSTXDCFAMINTERMD_PSY_ALL_CORE
Psychopharmacology x15 minutes

## 2023-10-31 NOTE — BH INPATIENT PSYCHIATRY PROGRESS NOTE - NSBHCONSBHPROVCNTCTNOFT_PSY_A_CORE
Not contacted as primary psychiatrist had already obtained collateral. 

## 2023-10-31 NOTE — PROGRESS NOTE ADULT - ATTENDING COMMENTS
77 y/o f with PMH of depression presents to ED with suicidal attempt by overdose. Medicine consulted for hypertension.     #HTN  - /88 after started on Losartan 25mg daily, will increase to 50mg po daily   - monitor BP    # smoking   - started nicotine patch 21mg for one week ( 10/20-)   - smoking cessation advised     #Weight Loss  patient states has had decreased appetite 2/2 stress. has lost weight over the past few months  - please start Ensure chocolate flavoring. 2 a day    Med consult will continue to follow.
77 y/o f with PMH of depression presents to ED with suicidal attempt by overdose. Medicine consulted for hypertension.     #HTN  - /90   - c/w Losartan 50mg po daily   - increase Nidefipine ER 30mg po daily -> bid   - monitor BP    # smoking   - started nicotine patch 21mg/d for one week ( 10/20-) the taper 14mg/d for one week then 7gm/d for one week   - smoking cessation advised     #Weight Loss  patient states has had decreased appetite 2/2 stress. has lost weight over the past few months  - started Ensure chocolate flavor 2 bottles a day  -  RN ok for friend to bring in Boost chocolate flavor and they can be kept in the [employer] fridge    Med consult will continue to follow.
77 y/o f with PMH of depression presents to ED with suicidal attempt by overdose. Medicine consulted for hypertension.     CC: Pt seen w. . ambulatory,  2 small red spot on left hand ( no itchiness, -     #HTN  - bp accepatble.  - c/w Losartan 50mg po daily   - c/w Nidefipine ER 30mg po bid   - monitor BP    # smoking   - started nicotine patch 21mg/d for one week ( 10/20-) the taper 14mg/d for one week then 7gm/d for one week   - smoking cessation advised     #Weight Loss  patient states has had decreased appetite 2/2 stress. has lost weight over the past few months  - started Ensure chocolate flavor 2 bottles a day    dry skin -encouraged -moisturizer.    Med consult will continue to follow. waqas Arroyo
77 y/o f with PMH of depression presents to ED with suicidal attempt by overdose. Medicine consulted for hypertension.     CC: Pt seen w. . ambulatory,  BP controlled.  chronic blepharitis getting eye drop and happy.  stated she is going home tomorrow.     #HTN-   - bp at goal   - c/w Losartan 50mg po daily   - c/w Nidefipine ER 30mg po getting daily  continue with current medication - need follow up PMD out-patient.    # smoking   - started nicotine patch 21mg/d for one week ( 10/20-) the taper 14mg/d for one week then 7gm/d for one week   - smoking cessation advised     #Weight Loss  patient states has had decreased appetite 2/2 stress. has lost weight over the past few months  - started Ensure chocolate flavor 2 bottles a day    dry skin -encouraged -moisturizer.  chronic blepharitis -artificial tear, lid message with clean fingers/clothes -warm compresses, can try topical antibiotics ( bacitracin/ erythromycin -depend on hospital formulary )    Medicine will sign off. to continue with current anti-hypertensive meds on discharge, follow up with PMD to recheck bp and adjust meds, fu with ophthalmologist. waqas Morris and HALLE RN.
79 y/o f with PMH of depression presents to ED with suicidal attempt by overdose. Medicine consulted for hypertension.     CC: Pt seen w. . ambulatory,  2 small red spot on left hand ( no itchiness,   BP controlled.  ambulating.    #HTN  - bp accepatble.  - c/w Losartan 50mg po daily   - c/w Nidefipine ER 30mg po bid   - monitor BP    # smoking   - started nicotine patch 21mg/d for one week ( 10/20-) the taper 14mg/d for one week then 7gm/d for one week   - smoking cessation advised     #Weight Loss  patient states has had decreased appetite 2/2 stress. has lost weight over the past few months  - started Ensure chocolate flavor 2 bottles a day    dry skin -encouraged -moisturizer.    Med consult will continue to follow. waqas Arroyo
Pt seen w. Dr. Whitmore. Pt reports she was a smoker 1ppd and had recently loss 20lbs 2/2 depression and anxiety.   //94  No hx HTN. Pt suspect elevated BP is related to nicotine withdrawal  - Start Losartan 25mg po daily  - Nicotine patch 21mg/day   - add Ensure chocolate flavor as supplement     Medicine team to follow, thank you.
Pt seen wLinda Venegas. /77. Ok to monitor BP off Amlodipine if pt not on it at home.   med consult team continues to follow pt with you, thank you.
77 y/o f with PMH of depression presents to ED with suicidal attempt by overdose. Medicine consulted for hypertension.     CC: Pt seen w. . ambulatory,  2 small red spot on left hand ( no itchiness- same as the day before.   ambulating.    #HTN  - bp accepatble.  - c/w Losartan 50mg po daily   - c/w Nidefipine ER 30mg po bid   - monitor BP    # smoking   - started nicotine patch 21mg/d for one week ( 10/20-) the taper 14mg/d for one week then 7gm/d for one week   - smoking cessation advised     #Weight Loss  patient states has had decreased appetite 2/2 stress. has lost weight over the past few months  - started Ensure chocolate flavor 2 bottles a day    dry skin -encouraged -moisturizer.    Med consult will continue to follow. waqas Arroyo,  team.
77 y/o f with PMH of depression presents to ED with suicidal attempt by overdose. Medicine consulted for hypertension.     CC: Pt seen w. . ambulatory,  BP controlled.  felt -chronic blepharitis ( left is bothering her- some redness on eyelid noted, vision intact.     #HTN-   - bp accepatble.- sbp 131   - c/w Losartan 50mg po daily   - c/w Nidefipine ER 30mg po getting daily  continue with current medication - need follow up PMD out-patient.    # smoking   - started nicotine patch 21mg/d for one week ( 10/20-) the taper 14mg/d for one week then 7gm/d for one week   - smoking cessation advised     #Weight Loss  patient states has had decreased appetite 2/2 stress. has lost weight over the past few months  - started Ensure chocolate flavor 2 bottles a day    dry skin -encouraged -moisturizer.  chronic blepharitis -artificial tear, lid message with clean fingers/clothes -warm compresses, can try topical antibiotics ( bacitracin/ erythromycin -depend on hospital formulary )    Med consult will continue to follow. [facility] team. Dr. Arroyo.
79 y/o f with PMH of depression presents to ED with suicidal attempt by overdose. Medicine consulted for hypertension.     #HTN  - /83  - c/w Losartan 50mg po daily   - c/w Nidefipine ER 30mg po bid   - monitor BP    # smoking   - started nicotine patch 21mg/d for one week ( 10/20-) the taper 14mg/d for one week then 7gm/d for one week   - smoking cessation advised     #Weight Loss  patient states has had decreased appetite 2/2 stress. has lost weight over the past few months  - started Ensure chocolate flavor 2 bottles a day  -  RN ok for friend to bring in Boost chocolate flavor and they can be kept in the [employer] fridge    Med consult will continue to follow.
79 y/o f with PMH of depression presents to ED with suicidal attempt by overdose. Medicine consulted for hypertension.     CC: Pt seen w. . Pt reports 2 tiny red spots on the dorsal aspect of left hand, denies bug bite, pruritis, pain. and also reports ankle swelling likely 2/2 recent weight loss.     #HTN  - /82  - c/w Losartan 50mg po daily   - c/w Nidefipine ER 30mg po bid   - monitor BP    # smoking   - started nicotine patch 21mg/d for one week ( 10/20-) the taper 14mg/d for one week then 7gm/d for one week   - smoking cessation advised     #Weight Loss  patient states has had decreased appetite 2/2 stress. has lost weight over the past few months  - started Ensure chocolate flavor 2 bottles a day    Med consult will continue to follow.

## 2023-10-31 NOTE — BH INPATIENT PSYCHIATRY PROGRESS NOTE - NSBHATTESTBILLING_PSY_A_CORE
84425-Jqjwebxxtq OBS or IP - moderate complexity OR 35-49 mins
57451-Bjtkvxuyhd OBS or IP - low complexity OR 25-34 mins
79125-Wyubtmcjem OBS or IP - moderate complexity OR 35-49 mins
83217-Fiypcdefte OBS or IP - moderate complexity OR 35-49 mins
44536-Xjupgiscdb OBS or IP - moderate complexity OR 35-49 mins
19305-Etpimxnnid OBS or IP - moderate complexity OR 35-49 mins
94579-Ywhweyykxj OBS or IP - moderate complexity OR 35-49 mins
77369-Aorzgpuqqi OBS or IP - low complexity OR 25-34 mins
89747-Mhdzcsgrdu OBS or IP - moderate complexity OR 35-49 mins
23369-Nvjxmkstmx OBS or IP - moderate complexity OR 35-49 mins
01454-Phjmgcxkqw OBS or IP - moderate complexity OR 35-49 mins
73314-Vhqrdbwlux OBS or IP - moderate complexity OR 35-49 mins

## 2023-10-31 NOTE — BH INPATIENT PSYCHIATRY PROGRESS NOTE - NSTXSUICIDGOAL_PSY_ALL_CORE
Other...
Will identify and utilize 2 coping skills
Other...
Will identify and utilize 2 coping skills
Other...
Will identify and utilize 2 coping skills
Will identify and utilize 2 coping skills
Other...
Other...

## 2023-10-31 NOTE — BH INPATIENT PSYCHIATRY PROGRESS NOTE - NSTXDEPRESGOAL_PSY_ALL_CORE
Other...
none

## 2023-10-31 NOTE — BH INPATIENT PSYCHIATRY PROGRESS NOTE - NSBHATTESTCOMMENTATTENDFT_PSY_A_CORE
Present for interaction with pt, PA-s. , patient expressing sxs of depression today, no active si/hi/avh or PI endorsed. Had a bout of nausea earlier this morning due to reported anxiety, zofran reported to be helpful. Will continue lexapro with plan to titrate to 10mg over the weekend. Medicine recs are appreciated, they recommend holding all antihypertensives as bp was within normal range. BMP was also reviewed by writer
Patient visible on the unit, seen attending groups, seen with treatment team today and later in family meeting with her friend Citlali to discuss treatment as well as aftercare planning. Discharge is planned for tomorrow. Pt noted to be bright, pleasant, reflective and optimistic. No si/hi/avh or PI endorsed. 
;;10/26: Not endorsing  suicidal or homicidal ideation intent or plans; no mention of auditory or visual hallucinations Alert; oriented; cognition intact; speech clear; no tremor or evidence of movement impairment.  pleasnt  Fair to good eye contact; speech clear spontaneous and normal volume Thinking is congruent with affect; no pecularities of thinking or language use.  well related;  apepars improved on current medicaiton regime    	
Patient reporting feeling more hopeful in regards to her financial situation and renovations on her apartment, but remains with sxs of depression. She does deny si and states that she will never again attempt to harm herself. No hi/avh or PI endorsed. Discharge focused and agreeable to robust aftercare plan which may include PHP and therapist post discharge. She requests that changes be made to sleep regimen as sleep has been overall poor. Remeron and trazodone dose to be increased to total of 15mg qhs and 100mg qhs respectively.   Medicine recs are appreciated and pt is aware of recent changes made to regimen by medicine consult team. One episode of diarrhea which has resolved.

## 2023-10-31 NOTE — BH INPATIENT PSYCHIATRY PROGRESS NOTE - NSBHCHARTREVIEWVS_PSY_A_CORE FT
Vital Signs Last 24 Hrs  T(C): 37.1 (10-31-23 @ 09:00), Max: 37.1 (10-31-23 @ 09:00)  T(F): 98.8 (10-31-23 @ 09:00), Max: 98.8 (10-31-23 @ 09:00)  HR: 83 (10-31-23 @ 09:00) (83 - 83)  BP: 135/80 (10-31-23 @ 09:00) (135/80 - 135/80)  BP(mean): --  RR: 18 (10-31-23 @ 09:00) (18 - 18)  SpO2: 96% (10-31-23 @ 09:00) (96% - 96%)

## 2023-10-31 NOTE — BH INPATIENT PSYCHIATRY PROGRESS NOTE - NSTXDCFAMPROGRES_PSY_ALL_CORE
Improving
No Change
Improving
No Change
Improving
No Change

## 2023-11-01 VITALS
RESPIRATION RATE: 18 BRPM | SYSTOLIC BLOOD PRESSURE: 129 MMHG | OXYGEN SATURATION: 96 % | DIASTOLIC BLOOD PRESSURE: 80 MMHG | TEMPERATURE: 99 F | HEART RATE: 73 BPM

## 2023-11-01 PROCEDURE — 99238 HOSP IP/OBS DSCHRG MGMT 30/<: CPT

## 2023-11-01 RX ADMIN — Medication 1 DROP(S): at 11:01

## 2023-11-01 RX ADMIN — LOSARTAN POTASSIUM 50 MILLIGRAM(S): 100 TABLET, FILM COATED ORAL at 10:03

## 2023-11-01 RX ADMIN — Medication 1 PATCH: at 11:01

## 2023-11-01 RX ADMIN — Medication 30 MILLIGRAM(S): at 10:03

## 2023-11-01 RX ADMIN — ESCITALOPRAM OXALATE 10 MILLIGRAM(S): 10 TABLET, FILM COATED ORAL at 10:03

## 2023-11-01 NOTE — BH PSYCHOLOGY - CLINICIAN PSYCHOTHERAPY NOTE - NSBHPSYCHOLGOALS_PSY_A_CORE
Assessment/Improve social/vocational/coping skills
Improve level of independent functioning/Improve social/vocational/coping skills
Assessment

## 2023-11-01 NOTE — BH DISCHARGE NOTE NURSING/SOCIAL WORK/PSYCH REHAB - NSCDUDCCRISIS_PSY_A_CORE
[facility]  1 [phone]) [facility] ([phone])  Text "WELL" to [phone]  Website: www.Fixed - Parking Tickets/.[employer] [facility] 1 (447) 621-HOPE (6049) Website: www.safehorizon.org/.National Suicide Prevention Lifeline 5 (940) 941-3490/.  [employer]  1 (411) LIFENET (989-0376)/988 Suicide and Crisis Lifeline

## 2023-11-01 NOTE — BH DISCHARGE NOTE NURSING/SOCIAL WORK/PSYCH REHAB - NSDCADDINFO2FT_PSY_ALL_CORE
***This is not an appointment*** Please reach out to your established provider to discuss continued outpatient treatment.

## 2023-11-01 NOTE — BH DISCHARGE NOTE NURSING/SOCIAL WORK/PSYCH REHAB - NSDCVIVACCINE_GEN_ALL_CORE_FT
influenza, high-dose, quadrivalent; 25-Oct-2023 13:58; Penny Browning (RN); [employer]; [id number] (Exp. Date: 01-Jul-2024); IntraMuscular; Deltoid Left.; 0.7 milliLiter(s); VIS (VIS Published: 06-Aug-2021, VIS Presented: 25-Oct-2023);

## 2023-11-01 NOTE — BH PSYCHOLOGY - CLINICIAN PSYCHOTHERAPY NOTE - NSTXDCFAMDATEEST_PSY_ALL_CORE
Cassie from patient's PCP  Chaim Michel office is requesting progress notes and test results from 2/1/22 to fax to [phone].   #[phone].  
17-Oct-2023

## 2023-11-01 NOTE — BH PSYCHOLOGY - CLINICIAN PSYCHOTHERAPY NOTE - TOKEN PULL-DIAGNOSIS
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      

## 2023-11-01 NOTE — BH PSYCHOLOGY - CLINICIAN PSYCHOTHERAPY NOTE - NSTXDEPRESGOALOTHER_PSY_ALL_CORE
Pt will participate in groups and milieu treatment structure of the unit
Pt. will participate in groups and milieu tx. structure of unit
Pt will participate in groups and milieu treatment structure of the unit

## 2023-11-01 NOTE — BH DISCHARGE NOTE NURSING/SOCIAL WORK/PSYCH REHAB - NSDCPRGOAL_PSY_ALL_CORE
Pt attended groups regularly during admission and participated appropriately.  Pt has demonstrated full range of affect and has been pleasant on approach.  Pt has demonstrated improvement in mood and has endorsed feeling more hopeful for the future.  Pt has endorsed feeling that her hospitalization was beneficial.  Pt was social with peers and supportive of others.  Pt endorsed readiness for discharge and completed a safety plan.

## 2023-11-01 NOTE — BH DISCHARGE NOTE NURSING/SOCIAL WORK/PSYCH REHAB - NSTOBACCOOTHER_PSY_ALL_CORE_FT
Call [facility] Smokers' Quitline at 0-187-YF-QUITS ([phone]) or visit [email or web address] if interested.

## 2023-11-01 NOTE — BH DISCHARGE NOTE NURSING/SOCIAL WORK/PSYCH REHAB - NSDCADDINFO1FT_PSY_ALL_CORE
Appointment on Thursday, 11/02/2023 at 10:30AM via Zoom. This is a virtual intake appointment from 10:30AM - 12:00PM. A Zoom link to access the meeting will be emailed to you at [email or web address] the morning of the appointment.

## 2023-11-01 NOTE — BH PSYCHOLOGY - CLINICIAN PSYCHOTHERAPY NOTE - NSBHPSYCHOLADDL_PSY_A_CORE
Severe episode of recurrent major depressive disorder, without psychotic features   F33.2     Felipe Brar is a 78 year old white  retired domiciled female with no pertinent PMHx. Reports a PPHx of depression, anxiety, insomnia and is currently seeing an outpatient psychiatrist. She has no prior psychiatric hospitalizations, no hx of SA or NSSIB. She seemed irritated to be on the unit but was also forthcoming and open with this writer. Though she has been socially isolated recently two friends were planning to visit her and were being very supportive. She reports a long history of MDD and her suicidality seems to have been exacerbated by her financial stress, the attendant shame, and moving out of the longtime apartment she shared with her late .          She presents with high risk due to the lethality of her recent attempt; however she is currently not reporting suicidal ideation and has no past attempts, so her risk has been mitigated at present and may improve further.     Static Recent attempt with serious lethality     Modifiable Outpatient therapy     Protective Denies current ideation on unit 
Severe episode of recurrent major depressive disorder, without psychotic features   F33.2   Felipe Brar is a 78 year old white  retired domiciled female with no pertinent PMHx with a recent suicide attempt and no history of other SA. Her mood seems to be improving on the unit and she is pleased to have the support of her friends. However, she continues to feel trapped and embarrassed by her financial situation, and struggles to come up with reasons for living in the face of the possibility of a big lifestyle change due to her finances. Ms. Brar seems to not want to commit suicide and stated that she had pushed off attempting to kill herself, but sees no choice due to her financial situation (she has a history of big spending and is currently struggling to pay for basic necessities while waiting for her apartment to sell). Though Ms. Brar acute risk has been mitigated she presents with moderate risk due to the severity and recency of her attempt.      Risk assessment as applicable (consider static vs modifiable risk factors and protective factors; comment on level of risk for dangerous behavior):   [x] suicide [] self-harm [] elopement [] aggression [] Other:    [] ideation	 [] intent	 [] plan    [] prior incidences (if checked, elaborate)   [] family history of suicide	[] family history of aggression      Static Recent attempt with serious lethality  Modifiable suicidal ideation, hopelessness and anxiety  Protective Denies current ideation on unit 
Severe episode of recurrent major depressive disorder, without psychotic features   F33.2     Felipe Brar is a 78 year old white  retired domiciled female with no pertinent PMHx with a recent suicide attempt and no history of other SA. Ms. Brar seems to have benefitted greatly from her time on the unit; she reports making friends with her roommate, taking advantage of the groups, and feeling the support of her friends; all of these things have lifted her mood immensely. Due to the severity of her past attempt she presents with chronic moderate risk for suicide; however, her acute suicide risk has been mitigated at present as she has a plan and a life she is looking forward to after discharge. Her low ratings on the CAMS assessment suggest low suicide risk at present.          Risk assessment as applicable (consider static vs modifiable risk factors and protective factors; comment on level of risk for dangerous behavior):     [x] suicide [] self-harm [] elopement [] aggression [] Other:      [] ideation	 [] intent	 [] plan      [] prior incidences (if checked, elaborate)     [] family history of suicide	[] family history of aggression          Static Recent attempt with serious lethality     Modifiable Outpatient therapy     Protective Denies current ideation on unit
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
## 2023-11-01 NOTE — BH DISCHARGE NOTE NURSING/SOCIAL WORK/PSYCH REHAB - PATIENT PORTAL LINK FT
You can access the FollowMyHealth Patient Portal offered by [facility] by registering at the following website: [email or web address]. By joining [employer]’s FollowMyHealth portal, you will also be able to view your health information using other applications (apps) compatible with our system.

## 2023-11-02 NOTE — BH SOCIAL WORK CONFIRMATION FOLLOW UP NOTE - NSLINKEDTOLOC_PSY_ALL_CORE
[facility] ([facility]  [address] [place] [address]  Attn: Nya Mack [facility]

## 2023-11-02 NOTE — BH SOCIAL WORK CONFIRMATION FOLLOW UP NOTE - NSCOMMENTS_PSY_ALL_CORE
CARING CONTACT [[id number]] @ [[id number]] Patient discharged on [11/01/2023]. Caring Call completed by AQUILINO Carroll on [11/02/2023]. AQUILINO unable to reach patient; AQUILINO left voicemail with contact information for callback.  ---   CARING CONTACT [[id number]] @ [6163] Patient discharged on [11/01/2023]. Caring Call completed by AQUILINO Carroll on [11/02/2023]. AQUILINO unable to reach patient; AQUILINO left voicemail with contact information for callback. // [id number] @ 1032 SW received voicemail from patient; patient unable to  call as she was in the shower. Patient discharge went smoothly. She was picked up by her friend who stayed with patient for the day. Patient's other friend also had groceries delivered to patient's apartment. Patient expressed gratitude for treatment on the unit and will follow up with outpatient treatment at [facility].  ---   CARING CONTACT [[id number]] @ [6708] Patient discharged on [11/01/2023]. Caring Call completed by AQUILINO Carroll on [11/02/2023]. AQUILINO unable to reach patient; AQUILINO left voicemail with contact information for callback. // [id number] @ 1032 SW received voicemail from patient; patient unable to  call as she was in the shower. Patient discharge went smoothly. She was picked up by her friend who stayed with patient for the day. Patient's other friend also had groceries delivered to patient's apartment. Patient expressed gratitude for treatment on the unit and will follow up with outpatient treatment at [facility].  ---  LINKAGE CALL [[id number]] @ [3123] AQUILINO e-mailed [[facility] ([facility] on [11/02/2023] to verify if patient's appointment on [11/03/2023] was rescheduled, as per patient. Per [facility], patient rescheduled for an in-person appointment for Tuesday, 11/07/2023 at 1:00PM due to technology issues. CARING CONTACT [[id number]] @ [2908] Patient discharged on [11/01/2023]. Caring Call completed by AQUILINO Carroll on [11/02/2023]. AQUILINO unable to reach patient; AQUILINO left voicemail with contact information for callback. // [id number] @ 1032 SW received voicemail from patient; patient unable to  call as she was in the shower. Patient discharge went smoothly. She was picked up by her friend who stayed with patient for the day. Patient's other friend also had groceries delivered to patient's apartment. Patient expressed gratitude for treatment on the unit and will follow up with outpatient treatment at [facility].  ---  LINKAGE CALL [[id number]] @ [1043] AQUILINO e-mailed [[facility] on [11/02/2023] to verify if patient's appointment on [11/03/2023] was rescheduled, as per patient. Per [facility], patient rescheduled for an in-person appointment for Tuesday, 11/07/2023 at 1:00PM due to technology issues. // [id number] @ [id number] AQUILINO confirmed patient linked to [facility] successfully.

## 2023-11-15 RX ORDER — CLONAZEPAM 1 MG
1 TABLET ORAL
Qty: 14 | Refills: 0
Start: 2023-11-15 | End: 2023-11-28

## 2023-11-15 RX ORDER — NICOTINE POLACRILEX 2 MG
1 GUM BUCCAL
Qty: 30 | Refills: 0
Start: 2023-11-15 | End: 2023-12-14

## 2023-11-15 RX ORDER — MIRTAZAPINE 45 MG/1
1 TABLET, ORALLY DISINTEGRATING ORAL
Qty: 14 | Refills: 0
Start: 2023-11-15 | End: 2023-11-28

## 2023-11-15 RX ORDER — LOSARTAN POTASSIUM 100 MG/1
1 TABLET, FILM COATED ORAL
Qty: 14 | Refills: 0
Start: 2023-11-15 | End: 2023-11-28

## 2023-11-15 RX ORDER — TRAZODONE HCL 50 MG
1 TABLET ORAL
Qty: 14 | Refills: 0
Start: 2023-11-15 | End: 2023-11-28

## 2023-11-15 RX ORDER — NIFEDIPINE 30 MG
1 TABLET, EXTENDED RELEASE 24 HR ORAL
Qty: 14 | Refills: 0
Start: 2023-11-15 | End: 2023-11-28

## 2023-11-15 RX ORDER — TOBRAMYCIN 0.3 %
1 DROPS OPHTHALMIC (EYE)
Qty: 1 | Refills: 0
Start: 2023-11-15 | End: 2023-11-28

## 2023-11-15 RX ORDER — ESCITALOPRAM OXALATE 10 MG/1
1 TABLET, FILM COATED ORAL
Qty: 14 | Refills: 0
Start: 2023-11-15 | End: 2023-11-28

## 2023-11-30 PROCEDURE — 80061 LIPID PANEL: CPT

## 2023-11-30 PROCEDURE — 85027 COMPLETE CBC AUTOMATED: CPT

## 2023-11-30 PROCEDURE — 99285 EMERGENCY DEPT VISIT HI MDM: CPT

## 2023-11-30 PROCEDURE — 85025 COMPLETE CBC W/AUTO DIFF WBC: CPT

## 2023-11-30 PROCEDURE — 87635 SARS-COV-2 COVID-19 AMP PRB: CPT

## 2023-11-30 PROCEDURE — 80053 COMPREHEN METABOLIC PANEL: CPT

## 2023-11-30 PROCEDURE — 85730 THROMBOPLASTIN TIME PARTIAL: CPT

## 2023-11-30 PROCEDURE — 82803 BLOOD GASES ANY COMBINATION: CPT

## 2023-11-30 PROCEDURE — 83036 HEMOGLOBIN GLYCOSYLATED A1C: CPT

## 2023-11-30 PROCEDURE — 80307 DRUG TEST PRSMV CHEM ANLYZR: CPT

## 2023-11-30 PROCEDURE — 85610 PROTHROMBIN TIME: CPT

## 2023-11-30 PROCEDURE — 36415 COLL VENOUS BLD VENIPUNCTURE: CPT

## 2023-11-30 PROCEDURE — 80048 BASIC METABOLIC PNL TOTAL CA: CPT

## 2023-11-30 PROCEDURE — 90662 IIV NO PRSV INCREASED AG IM: CPT

## 2024-02-22 NOTE — ED ADULT NURSE NOTE - AFFECT QUALITY
The patient informed this writer that she and her mother could not find where her prescriptions was sent. The patient and this writer contacted [facility] and confirmed that the medication we sent to that pharmacy. The pharmacy employee informed this writer that [facility] is unable to fill the script due to insurance and that he would send the script to [facility] pharmacy to be filled. This writer contacted [facility] and confirmed the medication could be administered at that location and left a message for Brenden to schedule an appointment for 2/28 to administer the medication.    This writer will continue to work with patient to ensure medication is taken as directed.     Jules Recinos LCSW.   [employer]

## 2025-03-04 ENCOUNTER — NON-APPOINTMENT (OUTPATIENT)
Age: 80
End: 2025-03-04

## 2025-03-04 PROBLEM — Z00.00 ENCOUNTER FOR PREVENTIVE HEALTH EXAMINATION: Status: ACTIVE | Noted: 2025-03-04

## 2025-03-05 ENCOUNTER — APPOINTMENT (OUTPATIENT)
Dept: UROLOGY | Facility: CLINIC | Age: 80
End: 2025-03-05
Payer: MEDICARE

## 2025-03-05 ENCOUNTER — NON-APPOINTMENT (OUTPATIENT)
Age: 80
End: 2025-03-05

## 2025-03-05 VITALS
TEMPERATURE: 97.2 F | HEIGHT: 62 IN | BODY MASS INDEX: 21.53 KG/M2 | WEIGHT: 117 LBS | SYSTOLIC BLOOD PRESSURE: 136 MMHG | DIASTOLIC BLOOD PRESSURE: 87 MMHG

## 2025-03-05 DIAGNOSIS — R31.29 OTHER MICROSCOPIC HEMATURIA: ICD-10-CM

## 2025-03-05 DIAGNOSIS — C67.9 MALIGNANT NEOPLASM OF BLADDER, UNSPECIFIED: ICD-10-CM

## 2025-03-05 PROCEDURE — 99205 OFFICE O/P NEW HI 60 MIN: CPT | Mod: 57,25

## 2025-03-05 PROCEDURE — 52000 CYSTOURETHROSCOPY: CPT

## 2025-03-07 LAB
APPEARANCE: CLEAR
BACTERIA: NEGATIVE /HPF
BILIRUBIN URINE: NEGATIVE
BLOOD URINE: ABNORMAL
CAST: 0 /LPF
COLOR: YELLOW
EPITHELIAL CELLS: 1 /HPF
GLUCOSE QUALITATIVE U: NEGATIVE MG/DL
KETONES URINE: NEGATIVE MG/DL
LEUKOCYTE ESTERASE URINE: ABNORMAL
MICROSCOPIC-UA: NORMAL
NITRITE URINE: NEGATIVE
PH URINE: 6
PROTEIN URINE: 100 MG/DL
RED BLOOD CELLS URINE: 67 /HPF
REVIEW: NORMAL
SPECIFIC GRAVITY URINE: 1.02
UROBILINOGEN URINE: 0.2 MG/DL
WHITE BLOOD CELLS URINE: 13 /HPF

## 2025-03-09 ENCOUNTER — OUTPATIENT (OUTPATIENT)
Dept: OUTPATIENT SERVICES | Facility: HOSPITAL | Age: 80
LOS: 1 days | End: 2025-03-09
Payer: MEDICARE

## 2025-03-09 PROCEDURE — 74178 CT ABD&PLV WO CNTR FLWD CNTR: CPT

## 2025-03-09 PROCEDURE — 74178 CT ABD&PLV WO CNTR FLWD CNTR: CPT | Mod: 26

## 2025-03-09 PROCEDURE — 71046 X-RAY EXAM CHEST 2 VIEWS: CPT | Mod: 26

## 2025-03-09 PROCEDURE — 82565 ASSAY OF CREATININE: CPT

## 2025-03-09 PROCEDURE — 71046 X-RAY EXAM CHEST 2 VIEWS: CPT

## 2025-03-10 DIAGNOSIS — N39.0 URINARY TRACT INFECTION, SITE NOT SPECIFIED: ICD-10-CM

## 2025-03-10 LAB
BACTERIA UR CULT: ABNORMAL
URINE CYTOLOGY: NORMAL

## 2025-03-10 RX ORDER — CIPROFLOXACIN HYDROCHLORIDE 500 MG/1
500 TABLET, FILM COATED ORAL
Qty: 20 | Refills: 0 | Status: ACTIVE | COMMUNITY
Start: 2025-03-10 | End: 1900-01-01

## 2025-03-11 ENCOUNTER — NON-APPOINTMENT (OUTPATIENT)
Age: 80
End: 2025-03-11

## 2025-03-17 ENCOUNTER — APPOINTMENT (OUTPATIENT)
Dept: UROLOGY | Facility: AMBULATORY SURGERY CENTER | Age: 80
End: 2025-03-17

## 2025-03-18 DIAGNOSIS — Z90.710 ACQUIRED ABSENCE OF BOTH CERVIX AND UTERUS: ICD-10-CM

## 2025-03-18 DIAGNOSIS — M41.9 SCOLIOSIS, UNSPECIFIED: ICD-10-CM

## 2025-03-18 DIAGNOSIS — N32.89 OTHER SPECIFIED DISORDERS OF BLADDER: ICD-10-CM

## 2025-03-18 DIAGNOSIS — M47.814 SPONDYLOSIS WITHOUT MYELOPATHY OR RADICULOPATHY, THORACIC REGION: ICD-10-CM

## 2025-03-18 DIAGNOSIS — N28.89 OTHER SPECIFIED DISORDERS OF KIDNEY AND URETER: ICD-10-CM

## 2025-03-18 DIAGNOSIS — Z01.818 ENCOUNTER FOR OTHER PREPROCEDURAL EXAMINATION: ICD-10-CM

## 2025-03-18 DIAGNOSIS — R59.0 LOCALIZED ENLARGED LYMPH NODES: ICD-10-CM

## 2025-03-18 DIAGNOSIS — R91.8 OTHER NONSPECIFIC ABNORMAL FINDING OF LUNG FIELD: ICD-10-CM

## 2025-03-18 DIAGNOSIS — N28.1 CYST OF KIDNEY, ACQUIRED: ICD-10-CM

## 2025-03-18 DIAGNOSIS — Q25.46 TORTUOUS AORTIC ARCH: ICD-10-CM

## 2025-03-18 DIAGNOSIS — K76.89 OTHER SPECIFIED DISEASES OF LIVER: ICD-10-CM

## 2025-03-18 DIAGNOSIS — R31.29 OTHER MICROSCOPIC HEMATURIA: ICD-10-CM

## 2025-03-18 DIAGNOSIS — I70.90 UNSPECIFIED ATHEROSCLEROSIS: ICD-10-CM

## 2025-03-18 DIAGNOSIS — N13.30 UNSPECIFIED HYDRONEPHROSIS: ICD-10-CM

## 2025-05-14 ENCOUNTER — APPOINTMENT (OUTPATIENT)
Dept: UROLOGY | Facility: CLINIC | Age: 80
End: 2025-05-14